# Patient Record
Sex: FEMALE | Race: WHITE | NOT HISPANIC OR LATINO | URBAN - METROPOLITAN AREA
[De-identification: names, ages, dates, MRNs, and addresses within clinical notes are randomized per-mention and may not be internally consistent; named-entity substitution may affect disease eponyms.]

---

## 2017-01-11 ENCOUNTER — FOLLOW UP ESTABLISHED (OUTPATIENT)
Dept: URBAN - METROPOLITAN AREA CLINIC 24 | Facility: CLINIC | Age: 72
End: 2017-01-11
Payer: MEDICARE

## 2017-01-11 DIAGNOSIS — H40.033 ANATOMICAL NARROW ANGLE, BILATERAL: Primary | ICD-10-CM

## 2017-01-11 PROCEDURE — 92012 INTRM OPH EXAM EST PATIENT: CPT | Performed by: OPHTHALMOLOGY

## 2017-01-11 ASSESSMENT — INTRAOCULAR PRESSURE
OS: 18
OD: 18

## 2021-01-14 DIAGNOSIS — Z23 NEED FOR VACCINATION: ICD-10-CM

## 2021-09-04 ENCOUNTER — OFFICE VISIT (OUTPATIENT)
Dept: URGENT CARE | Facility: CLINIC | Age: 76
End: 2021-09-04
Payer: MEDICARE

## 2021-09-04 ENCOUNTER — APPOINTMENT (OUTPATIENT)
Dept: RADIOLOGY | Facility: MEDICAL CENTER | Age: 76
End: 2021-09-04
Attending: EMERGENCY MEDICINE
Payer: MEDICARE

## 2021-09-04 ENCOUNTER — HOSPITAL ENCOUNTER (OUTPATIENT)
Facility: MEDICAL CENTER | Age: 76
End: 2021-09-05
Attending: EMERGENCY MEDICINE | Admitting: HOSPITALIST
Payer: MEDICARE

## 2021-09-04 VITALS
RESPIRATION RATE: 16 BRPM | BODY MASS INDEX: 34.23 KG/M2 | OXYGEN SATURATION: 92 % | HEIGHT: 62 IN | WEIGHT: 186 LBS | TEMPERATURE: 97.9 F | HEART RATE: 89 BPM

## 2021-09-04 DIAGNOSIS — K52.9 GASTROENTERITIS: ICD-10-CM

## 2021-09-04 DIAGNOSIS — J06.9 URI, ACUTE: ICD-10-CM

## 2021-09-04 DIAGNOSIS — I48.91 ATRIAL FIBRILLATION WITH RAPID VENTRICULAR RESPONSE (HCC): ICD-10-CM

## 2021-09-04 DIAGNOSIS — U07.1 COVID-19: ICD-10-CM

## 2021-09-04 PROBLEM — I10 ESSENTIAL HYPERTENSION: Status: ACTIVE | Noted: 2021-09-04

## 2021-09-04 PROBLEM — M06.9 RHEUMATOID ARTHRITIS INVOLVING MULTIPLE SITES (HCC): Status: ACTIVE | Noted: 2021-09-04

## 2021-09-04 PROBLEM — E86.0 DEHYDRATION: Status: ACTIVE | Noted: 2021-09-04

## 2021-09-04 PROBLEM — E87.6 HYPOKALEMIA: Status: ACTIVE | Noted: 2021-09-04

## 2021-09-04 LAB
ALBUMIN SERPL BCP-MCNC: 3.4 G/DL (ref 3.2–4.9)
ALBUMIN/GLOB SERPL: 0.9 G/DL
ALP SERPL-CCNC: 72 U/L (ref 30–99)
ALT SERPL-CCNC: 17 U/L (ref 2–50)
ANION GAP SERPL CALC-SCNC: 15 MMOL/L (ref 7–16)
AST SERPL-CCNC: 23 U/L (ref 12–45)
BASOPHILS # BLD AUTO: 0.3 % (ref 0–1.8)
BASOPHILS # BLD: 0.03 K/UL (ref 0–0.12)
BILIRUB SERPL-MCNC: 0.8 MG/DL (ref 0.1–1.5)
BUN SERPL-MCNC: 19 MG/DL (ref 8–22)
CALCIUM SERPL-MCNC: 9.4 MG/DL (ref 8.4–10.2)
CHLORIDE SERPL-SCNC: 95 MMOL/L (ref 96–112)
CO2 SERPL-SCNC: 26 MMOL/L (ref 20–33)
CREAT SERPL-MCNC: 0.73 MG/DL (ref 0.5–1.4)
EKG IMPRESSION: NORMAL
EKG IMPRESSION: NORMAL
EOSINOPHIL # BLD AUTO: 0.07 K/UL (ref 0–0.51)
EOSINOPHIL NFR BLD: 0.7 % (ref 0–6.9)
ERYTHROCYTE [DISTWIDTH] IN BLOOD BY AUTOMATED COUNT: 50 FL (ref 35.9–50)
FLUAV RNA SPEC QL NAA+PROBE: NEGATIVE
FLUBV RNA SPEC QL NAA+PROBE: NEGATIVE
GLOBULIN SER CALC-MCNC: 3.8 G/DL (ref 1.9–3.5)
GLUCOSE SERPL-MCNC: 92 MG/DL (ref 65–99)
HCT VFR BLD AUTO: 45.1 % (ref 37–47)
HGB BLD-MCNC: 15.1 G/DL (ref 12–16)
IMM GRANULOCYTES # BLD AUTO: 0.05 K/UL (ref 0–0.11)
IMM GRANULOCYTES NFR BLD AUTO: 0.5 % (ref 0–0.9)
LIPASE SERPL-CCNC: 18 U/L (ref 7–58)
LYMPHOCYTES # BLD AUTO: 2.17 K/UL (ref 1–4.8)
LYMPHOCYTES NFR BLD: 22.9 % (ref 22–41)
MCH RBC QN AUTO: 31.3 PG (ref 27–33)
MCHC RBC AUTO-ENTMCNC: 33.5 G/DL (ref 33.6–35)
MCV RBC AUTO: 93.6 FL (ref 81.4–97.8)
MONOCYTES # BLD AUTO: 0.79 K/UL (ref 0–0.85)
MONOCYTES NFR BLD AUTO: 8.4 % (ref 0–13.4)
NEUTROPHILS # BLD AUTO: 6.35 K/UL (ref 2–7.15)
NEUTROPHILS NFR BLD: 67.2 % (ref 44–72)
NRBC # BLD AUTO: 0 K/UL
NRBC BLD-RTO: 0 /100 WBC
PLATELET # BLD AUTO: 263 K/UL (ref 164–446)
PMV BLD AUTO: 10.4 FL (ref 9–12.9)
POTASSIUM SERPL-SCNC: 3.3 MMOL/L (ref 3.6–5.5)
PROT SERPL-MCNC: 7.2 G/DL (ref 6–8.2)
RBC # BLD AUTO: 4.82 M/UL (ref 4.2–5.4)
RSV RNA SPEC QL NAA+PROBE: NEGATIVE
SARS-COV-2 RNA RESP QL NAA+PROBE: DETECTED
SODIUM SERPL-SCNC: 136 MMOL/L (ref 135–145)
SPECIMEN SOURCE: ABNORMAL
TROPONIN T SERPL-MCNC: 9 NG/L (ref 6–19)
WBC # BLD AUTO: 9.5 K/UL (ref 4.8–10.8)

## 2021-09-04 PROCEDURE — 99220 PR INITIAL OBSERVATION CARE,LEVL III: CPT | Performed by: HOSPITALIST

## 2021-09-04 PROCEDURE — 94760 N-INVAS EAR/PLS OXIMETRY 1: CPT

## 2021-09-04 PROCEDURE — 85025 COMPLETE CBC W/AUTO DIFF WBC: CPT

## 2021-09-04 PROCEDURE — A9270 NON-COVERED ITEM OR SERVICE: HCPCS | Performed by: HOSPITALIST

## 2021-09-04 PROCEDURE — 93005 ELECTROCARDIOGRAM TRACING: CPT | Performed by: EMERGENCY MEDICINE

## 2021-09-04 PROCEDURE — G0378 HOSPITAL OBSERVATION PER HR: HCPCS

## 2021-09-04 PROCEDURE — C9803 HOPD COVID-19 SPEC COLLECT: HCPCS | Performed by: EMERGENCY MEDICINE

## 2021-09-04 PROCEDURE — 700111 HCHG RX REV CODE 636 W/ 250 OVERRIDE (IP): Performed by: EMERGENCY MEDICINE

## 2021-09-04 PROCEDURE — 0241U HCHG SARS-COV-2 COVID-19 NFCT DS RESP RNA 4 TRGT MIC: CPT

## 2021-09-04 PROCEDURE — 80053 COMPREHEN METABOLIC PANEL: CPT

## 2021-09-04 PROCEDURE — 83690 ASSAY OF LIPASE: CPT

## 2021-09-04 PROCEDURE — 84484 ASSAY OF TROPONIN QUANT: CPT

## 2021-09-04 PROCEDURE — 700101 HCHG RX REV CODE 250: Performed by: HOSPITALIST

## 2021-09-04 PROCEDURE — 71045 X-RAY EXAM CHEST 1 VIEW: CPT

## 2021-09-04 PROCEDURE — M0243 CASIRIVI AND IMDEVI INFUSION: HCPCS

## 2021-09-04 PROCEDURE — 99213 OFFICE O/P EST LOW 20 MIN: CPT | Performed by: NURSE PRACTITIONER

## 2021-09-04 PROCEDURE — 700102 HCHG RX REV CODE 250 W/ 637 OVERRIDE(OP): Performed by: HOSPITALIST

## 2021-09-04 PROCEDURE — 99285 EMERGENCY DEPT VISIT HI MDM: CPT

## 2021-09-04 RX ORDER — HYDROCODONE BITARTRATE AND ACETAMINOPHEN 5; 325 MG/1; MG/1
.5-1 TABLET ORAL EVERY 4 HOURS PRN
Status: DISCONTINUED | OUTPATIENT
Start: 2021-09-04 | End: 2021-09-05 | Stop reason: HOSPADM

## 2021-09-04 RX ORDER — HYDROCODONE BITARTRATE AND ACETAMINOPHEN 5; 325 MG/1; MG/1
.5-1 TABLET ORAL EVERY 4 HOURS PRN
COMMUNITY
End: 2023-06-22

## 2021-09-04 RX ORDER — SODIUM CHLORIDE AND POTASSIUM CHLORIDE 300; 900 MG/100ML; MG/100ML
INJECTION, SOLUTION INTRAVENOUS CONTINUOUS
Status: DISPENSED | OUTPATIENT
Start: 2021-09-04 | End: 2021-09-05

## 2021-09-04 RX ORDER — IBUPROFEN 200 MG
600 TABLET ORAL EVERY 8 HOURS PRN
COMMUNITY

## 2021-09-04 RX ORDER — OXYCODONE HYDROCHLORIDE 5 MG/1
2.5 TABLET ORAL
Status: DISCONTINUED | OUTPATIENT
Start: 2021-09-04 | End: 2021-09-04

## 2021-09-04 RX ORDER — HYDROCHLOROTHIAZIDE 25 MG/1
25 TABLET ORAL
COMMUNITY

## 2021-09-04 RX ORDER — METOPROLOL TARTRATE 1 MG/ML
5 INJECTION, SOLUTION INTRAVENOUS
Status: DISCONTINUED | OUTPATIENT
Start: 2021-09-04 | End: 2021-09-05 | Stop reason: HOSPADM

## 2021-09-04 RX ORDER — ALBUTEROL SULFATE 90 UG/1
1 AEROSOL, METERED RESPIRATORY (INHALATION) ONCE
COMMUNITY

## 2021-09-04 RX ORDER — POLYETHYLENE GLYCOL 3350 17 G/17G
1 POWDER, FOR SOLUTION ORAL
Status: DISCONTINUED | OUTPATIENT
Start: 2021-09-04 | End: 2021-09-05 | Stop reason: HOSPADM

## 2021-09-04 RX ORDER — OXYCODONE HYDROCHLORIDE 5 MG/1
5 TABLET ORAL
Status: DISCONTINUED | OUTPATIENT
Start: 2021-09-04 | End: 2021-09-04

## 2021-09-04 RX ORDER — BISACODYL 10 MG
10 SUPPOSITORY, RECTAL RECTAL
Status: DISCONTINUED | OUTPATIENT
Start: 2021-09-04 | End: 2021-09-05 | Stop reason: HOSPADM

## 2021-09-04 RX ORDER — DILTIAZEM HYDROCHLORIDE 5 MG/ML
20 INJECTION INTRAVENOUS ONCE
Status: DISCONTINUED | OUTPATIENT
Start: 2021-09-04 | End: 2021-09-05

## 2021-09-04 RX ORDER — HYDROMORPHONE HYDROCHLORIDE 1 MG/ML
0.25 INJECTION, SOLUTION INTRAMUSCULAR; INTRAVENOUS; SUBCUTANEOUS
Status: DISCONTINUED | OUTPATIENT
Start: 2021-09-04 | End: 2021-09-04

## 2021-09-04 RX ORDER — AMOXICILLIN 250 MG
2 CAPSULE ORAL 2 TIMES DAILY
Status: DISCONTINUED | OUTPATIENT
Start: 2021-09-04 | End: 2021-09-05 | Stop reason: HOSPADM

## 2021-09-04 RX ORDER — ACETAMINOPHEN 325 MG/1
650 TABLET ORAL EVERY 6 HOURS PRN
Status: DISCONTINUED | OUTPATIENT
Start: 2021-09-04 | End: 2021-09-05 | Stop reason: HOSPADM

## 2021-09-04 RX ADMIN — CASIRIVIMAB AND IMDEVIMAB 300 MG: 600; 600 INJECTION, SOLUTION, CONCENTRATE INTRAVENOUS at 17:14

## 2021-09-04 RX ADMIN — HYDROCODONE BITARTRATE AND ACETAMINOPHEN 1 TABLET: 5; 325 TABLET ORAL at 20:50

## 2021-09-04 RX ADMIN — POTASSIUM CHLORIDE AND SODIUM CHLORIDE: 900; 300 INJECTION, SOLUTION INTRAVENOUS at 22:19

## 2021-09-04 ASSESSMENT — COGNITIVE AND FUNCTIONAL STATUS - GENERAL
TOILETING: A LITTLE
DRESSING REGULAR UPPER BODY CLOTHING: A LITTLE
WALKING IN HOSPITAL ROOM: A LITTLE
TURNING FROM BACK TO SIDE WHILE IN FLAT BAD: A LITTLE
DAILY ACTIVITIY SCORE: 21
MOVING TO AND FROM BED TO CHAIR: A LITTLE
SUGGESTED CMS G CODE MODIFIER DAILY ACTIVITY: CJ
HELP NEEDED FOR BATHING: A LITTLE
MOVING FROM LYING ON BACK TO SITTING ON SIDE OF FLAT BED: A LITTLE
MOBILITY SCORE: 20
SUGGESTED CMS G CODE MODIFIER MOBILITY: CJ

## 2021-09-04 ASSESSMENT — ENCOUNTER SYMPTOMS
HALLUCINATIONS: 0
WEIGHT LOSS: 1
COUGH: 1
FEVER: 1
FOCAL WEAKNESS: 0
SHORTNESS OF BREATH: 1
SEIZURES: 0
NERVOUS/ANXIOUS: 0
NAUSEA: 1
HEMOPTYSIS: 0
SORE THROAT: 0
SPEECH CHANGE: 0
CHILLS: 1
STRIDOR: 0
SPUTUM PRODUCTION: 1
FLANK PAIN: 0
COUGH: 1
CHILLS: 1
EYE DISCHARGE: 0
ROS GI COMMENTS: ANOREXIA
DIARRHEA: 1
MYALGIAS: 0
RHINORRHEA: 1
LOSS OF CONSCIOUSNESS: 0
BRUISES/BLEEDS EASILY: 0

## 2021-09-04 ASSESSMENT — CHA2DS2 SCORE
CHF OR LEFT VENTRICULAR DYSFUNCTION: NO
AGE 75 OR GREATER: YES
DIABETES: NO
CHA2DS2 VASC SCORE: 4
SEX: FEMALE
AGE 65 TO 74: NO
HYPERTENSION: YES
PRIOR STROKE OR TIA OR THROMBOEMBOLISM: NO
VASCULAR DISEASE: NO

## 2021-09-04 NOTE — ED NOTES
Med Rec completed per patient and patient's med list on her phone (Returned)  Allergies reviewed  No ORAL antibiotics in last 30 days     Patient has Remicade IV q 8 weeks. Her most recent was 8/10/21

## 2021-09-04 NOTE — PROGRESS NOTES
Subjective     Ania Quevedo is a 75 y.o. female who presents with Illness (x 1 week, cough, diarrhea, back pain)    History reviewed. No pertinent past medical history.  Social History     Socioeconomic History   • Marital status:      Spouse name: Not on file   • Number of children: Not on file   • Years of education: Not on file   • Highest education level: Not on file   Occupational History   • Not on file   Tobacco Use   • Smoking status: Never Smoker   • Smokeless tobacco: Never Used   Substance and Sexual Activity   • Alcohol use: Not on file   • Drug use: Not on file   • Sexual activity: Not on file   Other Topics Concern   • Not on file   Social History Narrative   • Not on file     Social Determinants of Health     Financial Resource Strain:    • Difficulty of Paying Living Expenses:    Food Insecurity:    • Worried About Running Out of Food in the Last Year:    • Ran Out of Food in the Last Year:    Transportation Needs:    • Lack of Transportation (Medical):    • Lack of Transportation (Non-Medical):    Physical Activity:    • Days of Exercise per Week:    • Minutes of Exercise per Session:    Stress:    • Feeling of Stress :    Social Connections:    • Frequency of Communication with Friends and Family:    • Frequency of Social Gatherings with Friends and Family:    • Attends [employer] Services:    • Active Member of Clubs or Organizations:    • Attends Club or Organization Meetings:    • Marital Status:    Intimate Partner Violence:    • Fear of Current or Ex-Partner:    • Emotionally Abused:    • Physically Abused:    • Sexually Abused:      History reviewed. No pertinent family history.    Allergies: Patient has no allergy information on record.    Patient is a 75-year-old female who presents today with complaint of 1 week onset of cough, fatigue and malaise, shortness of breath, diarrhea, and lower back pain.  Patient endorses dyspnea with exertion.  No vomiting.  Oral intake has been  "decreased.          URI   This is a new problem. The current episode started in the past 7 days. The problem has been unchanged. There has been no fever. Associated symptoms include congestion, coughing and rhinorrhea. She has tried nothing for the symptoms. The treatment provided no relief.       Review of Systems   Constitutional: Positive for chills and malaise/fatigue.   HENT: Positive for congestion and rhinorrhea.    Respiratory: Positive for cough.    Skin: Negative.    All other systems reviewed and are negative.             Objective     Pulse 89   Temp 36.6 °C (97.9 °F) (Temporal)   Resp 16   Ht 1.575 m (5' 2\")   Wt 84.4 kg (186 lb)   SpO2 92%   BMI 34.02 kg/m²      Physical Exam  Vitals reviewed.   Constitutional:       Appearance: She is ill-appearing.   HENT:      Head: Normocephalic and atraumatic.      Right Ear: Tympanic membrane, ear canal and external ear normal.      Left Ear: Tympanic membrane, ear canal and external ear normal.      Nose: Congestion present.      Mouth/Throat:      Mouth: Mucous membranes are moist.   Eyes:      Extraocular Movements: Extraocular movements intact.      Conjunctiva/sclera: Conjunctivae normal.      Pupils: Pupils are equal, round, and reactive to light.   Cardiovascular:      Rate and Rhythm: Normal rate and regular rhythm.      Heart sounds: Normal heart sounds.   Pulmonary:      Effort: Pulmonary effort is normal. No respiratory distress.      Breath sounds: No stridor. Rales present. No wheezing or rhonchi.      Comments: Few scattered rales  Chest:      Chest wall: No tenderness.   Musculoskeletal:         General: Normal range of motion.      Cervical back: Normal range of motion and neck supple.   Skin:     General: Skin is warm.      Coloration: Skin is pale.   Neurological:      Mental Status: She is alert and oriented to person, place, and time.   Psychiatric:         Mood and Affect: Mood normal.         Behavior: Behavior normal.         Thought " Content: Thought content normal.         Judgment: Judgment normal.       Patient's oxygen saturation is 92% on room air.  She appears to be clinically ill.  As she has not been eating or drinking well, I am concerned that she needs a higher level of care than I can reasonably and safely provide here in the urgent care.  I have discussed going on to emergency room with patient and she is willing to do that at this time.  She is accompanied today by her  who is also ill with similar symptoms.                      Assessment & Plan   URI  Gastroenteritis    See note above  Patient is referred on to ER at this time for higher level of care and further evaluation.          There are no diagnoses linked to this encounter.

## 2021-09-04 NOTE — ED TRIAGE NOTES
"Pt is referred to our facility from [facility] for further evaluation and management of a possible acute URI with exertional dyspnea, and weakness protracting for the past 2 weeks.  She also C/O \"severe\" back pain recurring for the past 2 weeks.   Chief Complaint   Patient presents with   • Shortness of Breath   • Fatigue   • Back Pain   • Cough     /80   Pulse 80   Temp 36.1 °C (96.9 °F) (Temporal)   Resp 20   Ht 1.575 m (5' 2\")   Wt 85 kg (187 lb 6.3 oz)   SpO2 93%   BMI 34.27 kg/m²   Has this patient been vaccinated for COVID YES  If not, would they like to be vaccinated while in the ER if eligible?  N/A  Would the patient like to speak with the ERP about the possibility of receiving the COVID vaccine today before making a decision? N/A    "

## 2021-09-04 NOTE — ED PROVIDER NOTES
ED Provider Note    CHIEF COMPLAINT  Chief Complaint   Patient presents with   • Shortness of Breath   • Fatigue   • Back Pain   • Cough       HPI  Ania Quevedo is a 75 y.o. female who presents to the emergency department complaining that she has been ill for for 5 days and was recently exposed to Covid.  The patient has had her Covid vaccinations but her  has similar symptoms and was diagnosed with Covid today and has been hospitalized.  The patient has been coughing feeling short of breath having headache and low back discomfort and a little bit of diarrhea she has barely been eating or drinking anything and she feels very weak.  The patient notes she has a history of rheumatoid arthritis and takes methotrexate and Remicade.    REVIEW OF SYSTEMS no hemoptysis no black or bloody stool or emesis.  All other systems negative    PAST MEDICAL HISTORY  History reviewed. No pertinent past medical history.    FAMILY HISTORY  History reviewed. No pertinent family history.    SOCIAL HISTORY  Social History     Socioeconomic History   • Marital status:      Spouse name: Not on file   • Number of children: Not on file   • Years of education: Not on file   • Highest education level: Not on file   Occupational History   • Not on file   Tobacco Use   • Smoking status: Never Smoker   • Smokeless tobacco: Never Used   Substance and Sexual Activity   • Alcohol use: Not Currently   • Drug use: Never   • Sexual activity: Not on file   Other Topics Concern   • Not on file   Social History Narrative   • Not on file     Social Determinants of Health     Financial Resource Strain:    • Difficulty of Paying Living Expenses:    Food Insecurity:    • Worried About Running Out of Food in the Last Year:    • Ran Out of Food in the Last Year:    Transportation Needs:    • Lack of Transportation (Medical):    • Lack of Transportation (Non-Medical):    Physical Activity:    • Days of Exercise per Week:    • Minutes of Exercise per  "Session:    Stress:    • Feeling of Stress :    Social Connections:    • Frequency of Communication with Friends and Family:    • Frequency of Social Gatherings with Friends and Family:    • Attends [employer]regational Services:    • Active Member of Clubs or Organizations:    • Attends Club or Organization Meetings:    • Marital Status:    Intimate Partner Violence:    • Fear of Current or Ex-Partner:    • Emotionally Abused:    • Physically Abused:    • Sexually Abused:        SURGICAL HISTORY  History reviewed. No pertinent surgical history.    CURRENT MEDICATIONS  Home Medications     Reviewed by Noel Park R.N. (Registered Nurse) on 09/04/21 at 1230  Med List Status: Not Addressed   Medication Last Dose Status   hydroCHLOROthiazide (HYDRODIURIL) 25 MG Tab  Active   HYDROcodone-acetaminophen (NORCO) 5-325 MG Tab per tablet  Active   inFLIXimab (REMICADE IV)  Active   methotrexate 2.5 MG Tab  Active                ALLERGIES  No Known Allergies    PHYSICAL EXAM  VITAL SIGNS: /85   Pulse 87   Temp 36.1 °C (96.9 °F) (Temporal)   Resp 19   Ht 1.575 m (5' 2\")   Wt 85 kg (187 lb 6.3 oz)   SpO2 96%   BMI 34.27 kg/m²    Oxygen saturation is interpreted as adequate on supplemental oxygen but hypoxic on room air with values in the upper 80s  Constitutional: The patient is awake verbal she does appear ill  Eyes: No erythema discharge or jaundice  Neck: Trachea midline no JVD no meningeal findings  Cardiovascular: Initially regular rate and rhythm although the patient did have a change in rhythm to atrial fibrillation with RVR while in the ER see notes below  Lungs: Scattered mild rhonchi in both lung bases  Abdomen/Back: Soft nontender nondistended no rebound guarding or peritoneal findings  Skin: Warm and dry  Musculoskeletal: No leg edema or calf tenderness  Neurologic: Awake lucid verbal moving all extremities    Laboratory  CBC shows white blood cell count of 9.5 hemoglobin is adequate at 15.1 basic metabolic " panel is unremarkable LFTs and lipase are normal troponin is normal at 9.  Covid testing is positive    EKG interpretation  The initial EKG was obtained at the time of arrival and showed a sinus rhythm 72 bpm with no pathologic ST elevation or depression OR interval was 160 ms and the QTc interval 447 ms.    As noted above the patient had a change in rhythm while in the ER and a repeat EKG was obtained this EKG shows an irregularly irregular rhythm 137 bpm consistent with atrial fibrillation with rapid ventricular response.  It does not show ST elevation.    Radiology  DX-CHEST-PORTABLE (1 VIEW)   Final Result         No acute cardiac or pulmonary abnormality is identified.      EC-ECHOCARDIOGRAM COMPLETE W/O CONT    (Results Pending)         MEDICAL DECISION MAKING and DISPOSITION  Initially the patient's oxygen saturation was hovering around 90% on room air and she appeared to be in a normal sinus rhythm and appeared to be a candidate for going home even though she had been diagnosed with Covid.  The patient was counseled regarding the availability of Regeneron and I have discussed risks and benefits with her and also the ER pharmacist has reviewed this with the patient and she would like to proceed with this treatment and it was given in the ER.  Shortly before the planned time of discharge the patient got up and walked to the bathroom and when she came back and was placed on the cardiac monitor she was in rapid atrial fibrillation with RVR.  The patient says she feels very weak but she does not feel palpitations or chest pain and she states that she has never had atrial fibrillation before.  The patient's oxygen saturation also fell into the upper 90s as low as 88% on room air while I was in the room and she was placed on supplemental oxygen by nasal cannula.  I have ordered intravenous Cardizem for the patient.  At this point in time I do not feel that it is going to be safe to send the patient home we cannot  arrange home oxygen therapy and monitoring on the weekends and these are going to be necessary in the immediate future therefore I reviewed the case with the hospitalist and the patient is referred to the hospitalist service for further evaluation and treatment.    While in the emergency department the patient's rhythm did spontaneously convert to normal sinus rhythm.      IMPRESSION  1.  COVID-19 infection  2.  Hypoxia  3.  Paroxysm of atrial fibrillation, new onset, with rapid ventricular response      Electronically signed by: Christos Cortes M.D., 9/4/2021 3:31 PM

## 2021-09-05 ENCOUNTER — APPOINTMENT (OUTPATIENT)
Dept: CARDIOLOGY | Facility: MEDICAL CENTER | Age: 76
End: 2021-09-05
Attending: HOSPITALIST
Payer: MEDICARE

## 2021-09-05 VITALS
OXYGEN SATURATION: 92 % | RESPIRATION RATE: 18 BRPM | DIASTOLIC BLOOD PRESSURE: 57 MMHG | HEIGHT: 62 IN | WEIGHT: 187.39 LBS | TEMPERATURE: 97.5 F | BODY MASS INDEX: 34.48 KG/M2 | HEART RATE: 66 BPM | SYSTOLIC BLOOD PRESSURE: 108 MMHG

## 2021-09-05 LAB
ALBUMIN SERPL BCP-MCNC: 3.2 G/DL (ref 3.2–4.9)
ALBUMIN/GLOB SERPL: 0.9 G/DL
ALP SERPL-CCNC: 73 U/L (ref 30–99)
ALT SERPL-CCNC: 16 U/L (ref 2–50)
ANION GAP SERPL CALC-SCNC: 12 MMOL/L (ref 7–16)
AST SERPL-CCNC: 21 U/L (ref 12–45)
BASOPHILS # BLD AUTO: 0.4 % (ref 0–1.8)
BASOPHILS # BLD: 0.03 K/UL (ref 0–0.12)
BILIRUB SERPL-MCNC: 0.7 MG/DL (ref 0.1–1.5)
BUN SERPL-MCNC: 14 MG/DL (ref 8–22)
CALCIUM SERPL-MCNC: 9.3 MG/DL (ref 8.4–10.2)
CHLORIDE SERPL-SCNC: 99 MMOL/L (ref 96–112)
CO2 SERPL-SCNC: 26 MMOL/L (ref 20–33)
CREAT SERPL-MCNC: 0.67 MG/DL (ref 0.5–1.4)
CRP SERPL HS-MCNC: 2.8 MG/DL (ref 0–0.75)
EOSINOPHIL # BLD AUTO: 0.1 K/UL (ref 0–0.51)
EOSINOPHIL NFR BLD: 1.2 % (ref 0–6.9)
ERYTHROCYTE [DISTWIDTH] IN BLOOD BY AUTOMATED COUNT: 47.8 FL (ref 35.9–50)
ERYTHROCYTE [SEDIMENTATION RATE] IN BLOOD BY WESTERGREN METHOD: 22 MM/HOUR (ref 0–25)
GLOBULIN SER CALC-MCNC: 3.5 G/DL (ref 1.9–3.5)
GLUCOSE SERPL-MCNC: 144 MG/DL (ref 65–99)
HAV IGM SERPL QL IA: NORMAL
HBV CORE IGM SER QL: NORMAL
HBV SURFACE AG SER QL: NORMAL
HCT VFR BLD AUTO: 44.2 % (ref 37–47)
HCV AB SER QL: NORMAL
HGB BLD-MCNC: 15.2 G/DL (ref 12–16)
IMM GRANULOCYTES # BLD AUTO: 0.04 K/UL (ref 0–0.11)
IMM GRANULOCYTES NFR BLD AUTO: 0.5 % (ref 0–0.9)
LV EJECT FRACT  99904: 70
LV EJECT FRACT MOD 2C 99903: 65.46
LV EJECT FRACT MOD 4C 99902: 71.95
LV EJECT FRACT MOD BP 99901: 68.41
LYMPHOCYTES # BLD AUTO: 2.52 K/UL (ref 1–4.8)
LYMPHOCYTES NFR BLD: 30.6 % (ref 22–41)
MAGNESIUM SERPL-MCNC: 1.9 MG/DL (ref 1.5–2.5)
MCH RBC QN AUTO: 31.5 PG (ref 27–33)
MCHC RBC AUTO-ENTMCNC: 34.4 G/DL (ref 33.6–35)
MCV RBC AUTO: 91.5 FL (ref 81.4–97.8)
MONOCYTES # BLD AUTO: 0.72 K/UL (ref 0–0.85)
MONOCYTES NFR BLD AUTO: 8.7 % (ref 0–13.4)
NEUTROPHILS # BLD AUTO: 4.82 K/UL (ref 2–7.15)
NEUTROPHILS NFR BLD: 58.6 % (ref 44–72)
NRBC # BLD AUTO: 0 K/UL
NRBC BLD-RTO: 0 /100 WBC
NT-PROBNP SERPL IA-MCNC: 340 PG/ML (ref 0–125)
PLATELET # BLD AUTO: 259 K/UL (ref 164–446)
PMV BLD AUTO: 9.7 FL (ref 9–12.9)
POTASSIUM SERPL-SCNC: 3.4 MMOL/L (ref 3.6–5.5)
PROT SERPL-MCNC: 6.7 G/DL (ref 6–8.2)
RBC # BLD AUTO: 4.83 M/UL (ref 4.2–5.4)
SODIUM SERPL-SCNC: 137 MMOL/L (ref 135–145)
TSH SERPL DL<=0.005 MIU/L-ACNC: 2.67 UIU/ML (ref 0.38–5.33)
WBC # BLD AUTO: 8.2 K/UL (ref 4.8–10.8)

## 2021-09-05 PROCEDURE — 93325 DOPPLER ECHO COLOR FLOW MAPG: CPT

## 2021-09-05 PROCEDURE — A9270 NON-COVERED ITEM OR SERVICE: HCPCS | Performed by: HOSPITALIST

## 2021-09-05 PROCEDURE — 85652 RBC SED RATE AUTOMATED: CPT

## 2021-09-05 PROCEDURE — 93325 DOPPLER ECHO COLOR FLOW MAPG: CPT | Mod: 26 | Performed by: INTERNAL MEDICINE

## 2021-09-05 PROCEDURE — 84443 ASSAY THYROID STIM HORMONE: CPT

## 2021-09-05 PROCEDURE — 700102 HCHG RX REV CODE 250 W/ 637 OVERRIDE(OP): Performed by: HOSPITALIST

## 2021-09-05 PROCEDURE — 83880 ASSAY OF NATRIURETIC PEPTIDE: CPT

## 2021-09-05 PROCEDURE — 80053 COMPREHEN METABOLIC PANEL: CPT

## 2021-09-05 PROCEDURE — 93308 TTE F-UP OR LMTD: CPT | Mod: 26 | Performed by: INTERNAL MEDICINE

## 2021-09-05 PROCEDURE — 80074 ACUTE HEPATITIS PANEL: CPT

## 2021-09-05 PROCEDURE — 93321 DOPPLER ECHO F-UP/LMTD STD: CPT | Mod: 26 | Performed by: INTERNAL MEDICINE

## 2021-09-05 PROCEDURE — 85025 COMPLETE CBC W/AUTO DIFF WBC: CPT

## 2021-09-05 PROCEDURE — G0378 HOSPITAL OBSERVATION PER HR: HCPCS

## 2021-09-05 PROCEDURE — 86140 C-REACTIVE PROTEIN: CPT

## 2021-09-05 PROCEDURE — 83735 ASSAY OF MAGNESIUM: CPT

## 2021-09-05 PROCEDURE — 99217 PR OBSERVATION CARE DISCHARGE: CPT | Performed by: STUDENT IN AN ORGANIZED HEALTH CARE EDUCATION/TRAINING PROGRAM

## 2021-09-05 RX ORDER — WARFARIN SODIUM 5 MG/1
5 TABLET ORAL
Status: DISCONTINUED | OUTPATIENT
Start: 2021-09-05 | End: 2021-09-05 | Stop reason: HOSPADM

## 2021-09-05 RX ORDER — ASPIRIN 81 MG/1
81 TABLET, CHEWABLE ORAL DAILY
Qty: 30 TABLET | Refills: 0 | Status: SHIPPED | OUTPATIENT
Start: 2021-09-05

## 2021-09-05 RX ADMIN — HYDROCODONE BITARTRATE AND ACETAMINOPHEN 0.5 TABLET: 5; 325 TABLET ORAL at 05:03

## 2021-09-05 RX ADMIN — METOPROLOL TARTRATE 12.5 MG: 25 TABLET, FILM COATED ORAL at 10:32

## 2021-09-05 ASSESSMENT — COPD QUESTIONNAIRES: HAVE YOU SMOKED AT LEAST 100 CIGARETTES IN YOUR ENTIRE LIFE: NO/DON'T KNOW

## 2021-09-05 NOTE — ASSESSMENT & PLAN NOTE
We will switch hydrochlorothiazide to metoprolol given her dehydration and new onset atrial fibrillation

## 2021-09-05 NOTE — ASSESSMENT & PLAN NOTE
Got intravenous diltiazem  in the emergency department with improvement, and conversion to a sinus rhythm.  I will start scheduled metoprolol, consider switching to long-acting form according to clinical course.  I will start PRN metoprolol sustained HR > 120   Continuous cardiac monitoring.

## 2021-09-05 NOTE — ASSESSMENT & PLAN NOTE
Multifactorial, likely secondary to infection, hydrochlorothiazide use, reduced oral intake.  We will hold hydrochlorothiazide, encourage oral intake, antiemetics as needed.  Judicious use of intravenous fluids, monitor volume status closely

## 2021-09-05 NOTE — ASSESSMENT & PLAN NOTE
On immunosuppressants including methotrexate and infliximab  Not currently in exacerbation, multimodal pain control

## 2021-09-05 NOTE — DISCHARGE INSTRUCTIONS
You should quarantine at home for 14 days as you are Covid test result is positive.  You should arrange follow-up with your primary care doctor in 2 weeks.  Return here if you feel you are developing new or worsening symptoms.    Discharge Instructions    Discharged to home by car with relative. Discharged via wheelchair, hospital escort: Yes.  Special equipment needed: Not Applicable    Be sure to schedule a follow-up appointment with your primary care doctor or any specialists as instructed.     Discharge Plan:   Diet Plan: Discussed  Activity Level: Discussed  Confirmed Follow up Appointment: Patient to Call and Schedule Appointment  Confirmed Symptoms Management: Discussed  Medication Reconciliation Updated: Yes    I understand that a diet low in cholesterol, fat, and sodium is recommended for good health. Unless I have been given specific instructions below for another diet, I accept this instruction as my diet prescription.   Other diet: Regular    Special Instructions: None    · Is patient discharged on Warfarin / Coumadin?   No       COVID-19  COVID-19 is a respiratory infection that is caused by a virus called severe acute respiratory syndrome coronavirus 2 (SARS-CoV-2). The disease is also known as coronavirus disease or novel coronavirus. In some people, the virus may not cause any symptoms. In others, it may cause a serious infection. The infection can get worse quickly and can lead to complications, such as:  · Pneumonia, or infection of the lungs.  · Acute respiratory distress syndrome or ARDS. This is fluid build-up in the lungs.  · Acute respiratory failure. This is a condition in which there is not enough oxygen passing from the lungs to the body.  · Sepsis or septic shock. This is a serious bodily reaction to an infection.  · Blood clotting problems.  · Secondary infections due to bacteria or fungus.  The virus that causes COVID-19 is contagious. This means that it can spread from person to person  through droplets from coughs and sneezes (respiratory secretions).  What are the causes?  This illness is caused by a virus. You may catch the virus by:  · Breathing in droplets from an infected person's cough or sneeze.  · Touching something, like a table or a doorknob, that was exposed to the virus (contaminated) and then touching your mouth, nose, or eyes.  What increases the risk?  Risk for infection  You are more likely to be infected with this virus if you:  · Live in or travel to an area with a COVID-19 outbreak.  · Come in contact with a sick person who recently traveled to an area with a COVID-19 outbreak.  · Provide care for or live with a person who is infected with COVID-19.  Risk for serious illness  You are more likely to become seriously ill from the virus if you:  · Are 65 years of age or older.  · Have a long-term disease that lowers your body's ability to fight infection (immunocompromised).  · Live in a nursing home or long-term care facility.  · Have a long-term (chronic) disease such as:  ? Chronic lung disease, including chronic obstructive pulmonary disease or asthma  ? Heart disease.  ? Diabetes.  ? Chronic kidney disease.  ? Liver disease.  · Are obese.  What are the signs or symptoms?  Symptoms of this condition can range from mild to severe. Symptoms may appear any time from 2 to 14 days after being exposed to the virus. They include:  · A fever.  · A cough.  · Difficulty breathing.  · Chills.  · Muscle pains.  · A sore throat.  · Loss of taste or smell.  Some people may also have stomach problems, such as nausea, vomiting, or diarrhea.  Other people may not have any symptoms of COVID-19.  How is this diagnosed?  This condition may be diagnosed based on:  · Your signs and symptoms, especially if:  ? You live in an area with a COVID-19 outbreak.  ? You recently traveled to or from an area where the virus is common.  ? You provide care for or live with a person who was diagnosed with  COVID-19.  · A physical exam.  · Lab tests, which may include:  ? A nasal swab to take a sample of fluid from your nose.  ? A throat swab to take a sample of fluid from your throat.  ? A sample of mucus from your lungs (sputum).  ? Blood tests.  · Imaging tests, which may include, X-rays, CT scan, or ultrasound.  How is this treated?  At present, there is no medicine to treat COVID-19. Medicines that treat other diseases are being used on a trial basis to see if they are effective against COVID-19.  Your health care provider will talk with you about ways to treat your symptoms. For most people, the infection is mild and can be managed at home with rest, fluids, and over-the-counter medicines.  Treatment for a serious infection usually takes places in a hospital intensive care unit (ICU). It may include one or more of the following treatments. These treatments are given until your symptoms improve.  · Receiving fluids and medicines through an IV.  · Supplemental oxygen. Extra oxygen is given through a tube in the nose, a face mask, or a tilley.  · Positioning you to lie on your stomach (prone position). This makes it easier for oxygen to get into the lungs.  · Continuous positive airway pressure (CPAP) or bi-level positive airway pressure (BPAP) machine. This treatment uses mild air pressure to keep the airways open. A tube that is connected to a motor delivers oxygen to the body.  · Ventilator. This treatment moves air into and out of the lungs by using a tube that is placed in your windpipe.  · Tracheostomy. This is a procedure to create a hole in the neck so that a breathing tube can be inserted.  · Extracorporeal membrane oxygenation (ECMO). This procedure gives the lungs a chance to recover by taking over the functions of the heart and lungs. It supplies oxygen to the body and removes carbon dioxide.  Follow these instructions at home:  Lifestyle  · If you are sick, stay home except to get medical care. Your  health care provider will tell you how long to stay home. Call your health care provider before you go for medical care.  · Rest at home as told by your health care provider.  · Do not use any products that contain nicotine or tobacco, such as cigarettes, e-cigarettes, and chewing tobacco. If you need help quitting, ask your health care provider.  · Return to your normal activities as told by your health care provider. Ask your health care provider what activities are safe for you.  General instructions  · Take over-the-counter and prescription medicines only as told by your health care provider.  · Drink enough fluid to keep your urine pale yellow.  · Keep all follow-up visits as told by your health care provider. This is important.  How is this prevented?    There is no vaccine to help prevent COVID-19 infection. However, there are steps you can take to protect yourself and others from this virus.  To protect yourself:   · Do not travel to areas where COVID-19 is a risk. The areas where COVID-19 is reported change often. To identify high-risk areas and travel restrictions, check the CDC travel website: wwwnc.cdc.gov/travel/notices  · If you live in, or must travel to, an area where COVID-19 is a risk, take precautions to avoid infection.  ? Stay away from people who are sick.  ? Wash your hands often with soap and water for 20 seconds. If soap and water are not available, use an alcohol-based hand .  ? Avoid touching your mouth, face, eyes, or nose.  ? Avoid going out in public, follow guidance from your state and local health authorities.  ? If you must go out in public, wear a cloth face covering or face mask.  ? Disinfect objects and surfaces that are frequently touched every day. This may include:  § Counters and tables.  § Doorknobs and light switches.  § Sinks and faucets.  § Electronics, such as phones, remote controls, keyboards, computers, and tablets.  To protect others:  If you have symptoms of  COVID-19, take steps to prevent the virus from spreading to others.  · If you think you have a COVID-19 infection, contact your health care provider right away. Tell your health care team that you think you may have a COVID-19 infection.  · Stay home. Leave your house only to seek medical care. Do not use public transport.  · Do not travel while you are sick.  · Wash your hands often with soap and water for 20 seconds. If soap and water are not available, use alcohol-based hand .  · Stay away from other members of your household. Let healthy household members care for children and pets, if possible. If you have to care for children or pets, wash your hands often and wear a mask. If possible, stay in your own room, separate from others. Use a different bathroom.  · Make sure that all people in your household wash their hands well and often.  · Cough or sneeze into a tissue or your sleeve or elbow. Do not cough or sneeze into your hand or into the air.  · Wear a cloth face covering or face mask.  Where to find more information  · Centers for Disease Control and Prevention: www.cdc.gov/coronavirus/2019-ncov/index.html  · World Health Organization: www.who.int/health-topics/coronavirus  Contact a health care provider if:  · You live in or have traveled to an area where COVID-19 is a risk and you have symptoms of the infection.  · You have had contact with someone who has COVID-19 and you have symptoms of the infection.  Get help right away if:  · You have trouble breathing.  · You have pain or pressure in your chest.  · You have confusion.  · You have bluish lips and fingernails.  · You have difficulty waking from sleep.  · You have symptoms that get worse.  These symptoms may represent a serious problem that is an emergency. Do not wait to see if the symptoms will go away. Get medical help right away. Call your local emergency services (911 in the [place] Do not drive yourself to the hospital. Let the emergency  medical personnel know if you think you have COVID-19.  Summary  · COVID-19 is a respiratory infection that is caused by a virus. It is also known as coronavirus disease or novel coronavirus. It can cause serious infections, such as pneumonia, acute respiratory distress syndrome, acute respiratory failure, or sepsis.  · The virus that causes COVID-19 is contagious. This means that it can spread from person to person through droplets from coughs and sneezes.  · You are more likely to develop a serious illness if you are 65 years of age or older, have a weak immunity, live in a nursing home, or have chronic disease.  · There is no medicine to treat COVID-19. Your health care provider will talk with you about ways to treat your symptoms.  · Take steps to protect yourself and others from infection. Wash your hands often and disinfect objects and surfaces that are frequently touched every day. Stay away from people who are sick and wear a mask if you are sick.  This information is not intended to replace advice given to you by your health care provider. Make sure you discuss any questions you have with your health care provider.  Document Released: 01/23/2020 Document Revised: 05/14/2020 Document Reviewed: 01/23/2020  Elsevier Patient Education © 2020 [employer]      Atrial Fibrillation    Atrial fibrillation is a type of heartbeat that is irregular or fast (rapid). If you have this condition, your heart beats without any order. This makes it hard for your heart to pump blood in a normal way. Having this condition gives you more risk for stroke, heart failure, and other heart problems.  Atrial fibrillation may start all of a sudden and then stop on its own, or it may become a long-lasting problem.  What are the causes?  This condition may be caused by heart conditions, such as:  · High blood pressure.  · Heart failure.  · Heart valve disease.  · Heart surgery.  Other causes include:  · Pneumonia.  · Obstructive sleep  apnea.  · Lung cancer.  · Thyroid disease.  · Drinking too much alcohol.  Sometimes the cause is not known.  What increases the risk?  You are more likely to develop this condition if:  · You smoke.  · You are older.  · You have diabetes.  · You are overweight.  · You have a family history of this condition.  · You exercise often and hard.  What are the signs or symptoms?  Common symptoms of this condition include:  · A feeling like your heart is beating very fast.  · Chest pain.  · Feeling short of breath.  · Feeling light-headed or weak.  · Getting tired easily.  Follow these instructions at home:  Medicines  · Take over-the-counter and prescription medicines only as told by your doctor.  · If your doctor gives you a blood-thinning medicine, take it exactly as told. Taking too much of it can cause bleeding. Taking too little of it does not protect you against clots. Clots can cause a stroke.  Lifestyle         · Do not use any tobacco products. These include cigarettes, chewing tobacco, and e-cigarettes. If you need help quitting, ask your doctor.  · Do not drink alcohol.  · Do not drink beverages that have caffeine. These include coffee, soda, and tea.  · Follow diet instructions as told by your doctor.  · Exercise regularly as told by your doctor.  General instructions  · If you have a condition that causes breathing to stop for a short period of time (apnea), treat it as told by your doctor.  · Keep a healthy weight. Do not use diet pills unless your doctor says they are safe for you. Diet pills may make heart problems worse.  · Keep all follow-up visits as told by your doctor. This is important.  Contact a doctor if:  · You notice a change in the speed, rhythm, or strength of your heartbeat.  · You are taking a blood-thinning medicine and you see more bruising.  · You get tired more easily when you move or exercise.  · You have a sudden change in weight.  Get help right away if:    · You have pain in your  "chest or your belly (abdomen).  · You have trouble breathing.  · You have blood in your vomit, poop, or pee (urine).  · You have any signs of a stroke. \"BE FAST\" is an easy way to remember the main warning signs:  ? B - Balance. Signs are dizziness, sudden trouble walking, or loss of balance.  ? E - Eyes. Signs are trouble seeing or a change in how you see.  ? F - Face. Signs are sudden weakness or loss of feeling in the face, or the face or eyelid drooping on one side.  ? A - Arms. Signs are weakness or loss of feeling in an arm. This happens suddenly and usually on one side of the body.  ? S - Speech. Signs are sudden trouble speaking, slurred speech, or trouble understanding what people say.  ? T - Time. Time to call emergency services. Write down what time symptoms started.  · You have other signs of a stroke, such as:  ? A sudden, very bad headache with no known cause.  ? Feeling sick to your stomach (nausea).  ? Throwing up (vomiting).  ? Jerky movements you cannot control (seizure).  These symptoms may be an emergency. Do not wait to see if the symptoms will go away. Get medical help right away. Call your local emergency services (911 in the [place] Do not drive yourself to the hospital.  Summary  · Atrial fibrillation is a type of heartbeat that is irregular or fast (rapid).  · You are at higher risk of this condition if you smoke, are older, have diabetes, or are overweight.  · Follow your doctor's instructions about medicines, diet, exercise, and follow-up visits.  · Get help right away if you think that you have signs of a stroke.  This information is not intended to replace advice given to you by your health care provider. Make sure you discuss any questions you have with your health care provider.  Document Released: 09/26/2009 Document Revised: 02/21/2019 Document Reviewed: 02/08/2019  [employer] Patient Education © 2020 [employer]    Metoprolol tablets  What is this medicine?  METOPROLOL (me TOE proe " lole) is a beta-blocker. Beta-blockers reduce the workload on the heart and help it to beat more regularly. This medicine is used to treat high blood pressure and to prevent chest pain. It is also used to after a heart attack and to prevent an additional heart attack from occurring.  This medicine may be used for other purposes; ask your health care provider or pharmacist if you have questions.  COMMON BRAND NAME(S): Lopressor  What should I tell my health care provider before I take this medicine?  They need to know if you have any of these conditions:  · diabetes  · heart or vessel disease like slow heart rate, worsening heart failure, heart block, sick sinus syndrome or Raynaud's disease  · kidney disease  · liver disease  · lung or breathing disease, like asthma or emphysema  · pheochromocytoma  · thyroid disease  · an unusual or allergic reaction to metoprolol, other beta-blockers, medicines, foods, dyes, or preservatives  · pregnant or trying to get pregnant  · breast-feeding  How should I use this medicine?  Take this medicine by mouth with a drink of water. Follow the directions on the prescription label. Take this medicine immediately after meals. Take your doses at regular intervals. Do not take more medicine than directed. Do not stop taking this medicine suddenly. This could lead to serious heart-related effects.  Talk to your pediatrician regarding the use of this medicine in children. Special care may be needed.  Overdosage: If you think you have taken too much of this medicine contact a poison control center or emergency room at once.  NOTE: This medicine is only for you. Do not share this medicine with others.  What if I miss a dose?  If you miss a dose, take it as soon as you can. If it is almost time for your next dose, take only that dose. Do not take double or extra doses.  What may interact with this medicine?  This medicine may interact with the following medications:  · certain medicines for  blood pressure, heart disease, irregular heart beat  · certain medicines for depression like monoamine oxidase (MAO) inhibitors, fluoxetine, or paroxetine  · clonidine  · dobutamine  · epinephrine  · isoproterenol  · reserpine  This list may not describe all possible interactions. Give your health care provider a list of all the medicines, herbs, non-prescription drugs, or dietary supplements you use. Also tell them if you smoke, drink alcohol, or use illegal drugs. Some items may interact with your medicine.  What should I watch for while using this medicine?  Visit your doctor or health care professional for regular check ups. Contact your doctor right away if your symptoms worsen. Check your blood pressure and pulse rate regularly. Ask your health care professional what your blood pressure and pulse rate should be, and when you should contact them.  You may get drowsy or dizzy. Do not drive, use machinery, or do anything that needs mental alertness until you know how this medicine affects you. Do not sit or stand up quickly, especially if you are an older patient. This reduces the risk of dizzy or fainting spells. Contact your doctor if these symptoms continue. Alcohol may interfere with the effect of this medicine. Avoid alcoholic drinks.  This medicine may increase blood sugar. Ask your healthcare provider if changes in diet or medicines are needed if you have diabetes.  What side effects may I notice from receiving this medicine?  Side effects that you should report to your doctor or health care professional as soon as possible:  · allergic reactions like skin rash, itching or hives  · cold or numb hands or feet  · depression  · difficulty breathing  · faint  · fever with sore throat  · irregular heartbeat, chest pain  · rapid weight gain  ·   signs and symptoms of high blood sugar such as being more thirsty or hungry or having to urinate more than normal. You may also feel very tired or have blurry  vision.  · swollen legs or ankles  Side effects that usually do not require medical attention (report to your doctor or health care professional if they continue or are bothersome):  · anxiety or nervousness  · change in sex drive or performance  · dry skin  · headache  · nightmares or trouble sleeping  · short term memory loss  · stomach upset or diarrhea  This list may not describe all possible side effects. Call your doctor for medical advice about side effects. You may report side effects to FDA at 7-337-HOS-6862.  Where should I keep my medicine?  Keep out of the reach of children.  Store at room temperature between 15 and 30 degrees C (59 and 86 degrees F). Throw away any unused medicine after the expiration date.  NOTE: This sheet is a summary. It may not cover all possible information. If you have questions about this medicine, talk to your doctor, pharmacist, or health care provider.  © 2020 Elsevier/Gold Standard (2019-10-08 11:15:23)      Depression / Suicide Risk    As you are discharged from this [facility] facility, it is important to learn how to keep safe from harming yourself.    Recognize the warning signs:  · Abrupt changes in personality, positive or negative- including increase in energy   · Giving away possessions  · Change in eating patterns- significant weight changes-  positive or negative  · Change in sleeping patterns- unable to sleep or sleeping all the time   · Unwillingness or inability to communicate  · Depression  · Unusual sadness, discouragement and loneliness  · Talk of wanting to die  · Neglect of personal appearance   · Rebelliousness- reckless behavior  · Withdrawal from people/activities they love  · Confusion- inability to concentrate     If you or a loved one observes any of these behaviors or has concerns about self-harm, here's what you can do:  · Talk about it- your feelings and reasons for harming yourself  · Remove any means that you might use to hurt yourself  (examples: pills, rope, extension cords, firearm)  · Get professional help from the community (Mental Health, Substance Abuse, psychological counseling)  · Do not be alone:Call your Safe Contact- someone whom you trust who will be there for you.  · Call your local CRISIS HOTLINE 888-1968 or 305-736-2346  · Call your local Children's Mobile Crisis Response Team [place] (546) 376-4095 or www.[employer]Sc[employer]  · Call the toll free National Suicide Prevention Hotlines   · National Suicide Prevention Lifeline 505-578-BOSJ (7300)  · National Hope Line Network 800-SUICIDE (905-1034)

## 2021-09-05 NOTE — DISCHARGE SUMMARY
Discharge Summary    CHIEF COMPLAINT ON ADMISSION  Chief Complaint   Patient presents with   • Shortness of Breath   • Fatigue   • Back Pain   • Cough       Reason for Admission  Shortness of Breaht, Weak, Back Pa*     Admission Date  9/4/2021    CODE STATUS  Full Code    HPI & HOSPITAL COURSE  Ania Quevedo is a 75 y.o. female with a past medical history of hypertension, rheumatoid arthritis on immunosuppressive including infliximab and methotrexate, who has received 2 dose vaccination, was planning to get a third 1 who presented 9/4/2021 with worsening shortness of breath and generalized weakness.  Patient reports that she has not been feeling well over the past 10-14 days.  She reports having generalized weakness easy fatigability anorexia, nausea and diarrhea.  Denies having blood or mucus in the diarrhea.  She reports losing 5-10 pounds.  She reports that her shortness of breath is mostly with exertion she does have cough that is painful without hemoptysis.  Denies having orthopnea paroxysmal nocturnal dyspnea.  She denies having lower extremity pain redness or swelling.  Initial plan was to discharge the patient however she suddenly developed worsening shortness of breath monitoring showed evidence for atrial fibrillation with rapid ventricular response with heart rate of around 150s that improved with a dose of intravenous diltiazem.     Patient started on p.o. metoprolol and converted to sinus rhythm shortly after admission.  Her oxygen requirements were titrated off and as per home oxygen evaluation she did not require oxygen at rest or during ambulation.  Patient started on Xarelto on admission but co-pay too high and thus after an extensive discussion regarding anticoagulation, patient will follow up with primary care provider on Tuesday to discuss anticoagulation options.  She was discharged on aspirin.  All results and plan of action were discussed with the patient for her she voiced understanding agree  with the primary care team.  Patient was instructed to quarantine at home for the next 2 weeks and return to emergency department symptoms were to worsen.    Therefore, she is discharged in good and stable condition to home with close outpatient follow-up.    The patient recovered much more quickly than anticipated on admission.    Discharge Date  9/5/2021    FOLLOW UP ITEMS POST DISCHARGE  Primary care provider to discuss anticoagulation options for atrial fibrillation  Primary care provider to follow post hospital discharge care    DISCHARGE DIAGNOSES  Principal Problem:    Atrial fibrillation with rapid ventricular response (HCC) POA: Yes  Active Problems:    Hypokalemia POA: Yes    COVID-19 virus infection POA: Yes    Rheumatoid arthritis involving multiple sites (HCC) POA: Yes    New onset AF (atrial fibrillation) (HCC) POA: Yes    Essential hypertension POA: Yes    Dehydration POA: Yes  Resolved Problems:    * No resolved hospital problems. *      FOLLOW UP  No future appointments.  Jimbo Sanchez M.D.  [address]  [facility] [address]  [phone]      for your appointment on Tuesday 9/7/21 - ask if they can do tele medicine appointment      MEDICATIONS ON DISCHARGE     Medication List      START taking these medications      Instructions   aspirin 81 MG Chew chewable tablet  Commonly known as: ASA   Chew 1 Tablet every day.  Dose: 81 mg     metoprolol tartrate 25 MG Tabs  Commonly known as: LOPRESSOR   Take 0.5 Tablets by mouth 2 times a day.  Dose: 12.5 mg        CONTINUE taking these medications      Instructions   albuterol 108 (90 Base) MCG/ACT Aers inhalation aerosol   Inhale 1 Puff one time.  Dose: 1 Puff     hydroCHLOROthiazide 25 MG Tabs  Commonly known as: HYDRODIURIL   Take 25 mg by mouth every Monday, Wednesday, and Friday.  Dose: 25 mg     HYDROcodone-acetaminophen 5-325 MG Tabs per tablet  Commonly known as: NORCO   Take 0.5-1 Tablets by mouth every four hours as needed (Pain).  Dose:  0.5-1 Tablet     ibuprofen 200 MG Tabs  Commonly known as: MOTRIN   Take 600 mg by mouth every 8 hours as needed for Mild Pain.  Dose: 600 mg     MAGNESIUM PO   Take 1 Tablet by mouth every day.  Dose: 1 Tablet     methotrexate 2.5 MG Tabs   Take 2.5 mg by mouth every 7 days. Patient takes on Wednesday  Dose: 2.5 mg     REMICADE IV   Infuse 1 Dose into a venous catheter every 8 weeks.  Dose: 1 Dose     VITAMIN B-12 PO   Take 1 Tablet by mouth every day.  Dose: 1 Tablet     VITAMIN C PO   Take 1 Tablet by mouth every day.  Dose: 1 Tablet     VITAMIN D PO   Take 1 Tablet by mouth every day.  Dose: 1 Tablet            Allergies  No Known Allergies    DIET  Orders Placed This Encounter   Procedures   • Diet Order Diet: Cardiac     Standing Status:   Standing     Number of Occurrences:   1     Order Specific Question:   Diet:     Answer:   Cardiac [6]       ACTIVITY  As tolerated.  Weight bearing as tolerated    CONSULTATIONS  None    PROCEDURES  None    LABORATORY  Lab Results   Component Value Date    SODIUM 137 09/05/2021    POTASSIUM 3.4 (L) 09/05/2021    CHLORIDE 99 09/05/2021    CO2 26 09/05/2021    GLUCOSE 144 (H) 09/05/2021    BUN 14 09/05/2021    CREATININE 0.67 09/05/2021        Lab Results   Component Value Date    WBC 8.2 09/05/2021    HEMOGLOBIN 15.2 09/05/2021    HEMATOCRIT 44.2 09/05/2021    PLATELETCT 259 09/05/2021        Total time of the discharge process exceeds 34 minutes.

## 2021-09-05 NOTE — PROGRESS NOTES
Telemetry Shift Summary     Rhythm SR-ST/a-fib  HR Range  (PSVT up to 177)  Ectopy F-PVCs  Measurements 0.08/0.10/0.32 when in sinus  ---/0.08/-- when converted to a-fib    Pt converted to a-fib at approximately 0330. Pt asymptomatic. Dr. Low made aware.            Normal Values  Rhythm SR  HR Range    Measurements 0.12-0.20 / 0.06-0.10  / 0.30-0.52

## 2021-09-05 NOTE — ASSESSMENT & PLAN NOTE
I will check echocardiography.  I will check thyroid function.  I will start  metoprolol  I will start PRN metoprolol diltiazem for sustained HR > 120   Continuous cardiac monitoring.   [id number] Score 4  Started on rivaroxaban in the emergency department, will continue

## 2021-09-05 NOTE — ED NOTES
Patient was being prepared for discharge but noted a change to heart rate. Pt denies hx of a fib. MD informed and EKG obtained.

## 2021-09-05 NOTE — H&P
Hospital Medicine History & Physical Note    Date of Service  9/4/2021    Primary Care Physician  Jimbo Sanchez M.D.    Consultants  None     Code Status  Full Code    Chief Complaint  Chief Complaint   Patient presents with   • Shortness of Breath   • Fatigue   • Back Pain   • Cough     History of Presenting Illness  Ania Quevedo is a 75 y.o. female with a past medical history of hypertension, rheumatoid arthritis on immunosuppressive including infliximab and methotrexate, who has received 2 dose vaccination, was planning to get a third 1 who presented 9/4/2021 with worsening shortness of breath and generalized weakness.  Patient reports that she has not been feeling well over the past 10-14 days.  She reports having generalized weakness easy fatigability anorexia, nausea and diarrhea.  Denies having blood or mucus in the diarrhea.  She reports losing 5-10 pounds.  She reports that her shortness of breath is mostly with exertion she does have cough that is painful without hemoptysis.  Denies having orthopnea paroxysmal nocturnal dyspnea.  She denies having lower extremity pain redness or swelling.  Initial plan was to discharge the patient however she suddenly developed worsening shortness of breath monitoring showed evidence for atrial fibrillation with rapid ventricular response with heart rate of around 150s that improved with a dose of intravenous diltiazem.     I discussed the plan of care with patient.    Review of Systems  Review of Systems   Constitutional: Positive for chills, fever, malaise/fatigue and weight loss.   HENT: Negative for congestion and sore throat.    Eyes: Negative for discharge.   Respiratory: Positive for cough, sputum production and shortness of breath. Negative for hemoptysis and stridor.    Cardiovascular: Negative for chest pain.   Gastrointestinal: Positive for diarrhea and nausea.        Anorexia   Genitourinary: Negative for flank pain, hematuria and urgency.    Musculoskeletal: Negative for myalgias.   Skin: Negative for itching.   Neurological: Negative for speech change, focal weakness, seizures and loss of consciousness.   Endo/Heme/Allergies: Does not bruise/bleed easily.   Psychiatric/Behavioral: Negative for hallucinations and suicidal ideas. The patient is not nervous/anxious.      Past Medical History   has a past medical history of Back pain, Hypertension, and Rheumatoid arthritis (HCC).    Surgical History   has a past surgical history that includes dental extraction(s).     Family History  family history includes Heart Disease in her mother.     Social History   reports that she has never smoked. She has never used smokeless tobacco. She reports previous alcohol use. She reports that she does not use drugs.    Allergies  No Known Allergies    Medications  Prior to Admission Medications   Prescriptions Last Dose Informant Patient Reported? Taking?   Ascorbic Acid (VITAMIN C PO) 8/30/2021 at [facility] Patient Yes Yes   Sig: Take 1 Tablet by mouth every day.   Cyanocobalamin (VITAMIN B-12 PO) 8/30/2021 at [facility] Patient Yes Yes   Sig: Take 1 Tablet by mouth every day.   HYDROcodone-acetaminophen (NORCO) 5-325 MG Tab per tablet 9/3/2021 at afternoon Patient Yes No   Sig: Take 0.5-1 Tablets by mouth every four hours as needed (Pain).   MAGNESIUM PO 8/30/2021 at [facility] Patient Yes Yes   Sig: Take 1 Tablet by mouth every day.   VITAMIN D PO 8/30/2021 at [facility] Patient Yes Yes   Sig: Take 1 Tablet by mouth every day.   albuterol 108 (90 Base) MCG/ACT Aero Soln inhalation aerosol A week ago at [facility] Patient Yes Yes   Sig: Inhale 1 Puff one time.   hydroCHLOROthiazide (HYDRODIURIL) 25 MG Tab 8/30/2021 at [facility] Patient Yes No   Sig: Take 25 mg by mouth every Monday, Wednesday, and Friday.   ibuprofen (MOTRIN) 200 MG Tab 9/3/2021 at pm Patient Yes Yes   Sig: Take 600 mg by mouth every 8 hours as needed for Mild Pain.   inFLIXimab (REMICADE IV) 8/10/2021 at [facility] Patient Yes No   Sig: Infuse 1  Dose into a venous catheter every 8 weeks.   methotrexate 2.5 MG Tab 8/25/2021 at [facility] Patient Yes No   Sig: Take 2.5 mg by mouth every 7 days. Patient takes on Wednesday      Facility-Administered Medications: None     Physical Exam  Temp:  [36.1 °C (96.9 °F)-36.6 °C (97.9 °F)] 36.1 °C (96.9 °F)  Pulse:  [] 99  Resp:  [16-24] 20  BP: (131-164)/(68-85) 131/70  SpO2:  [89 %-96 %] 95 %    Physical Exam  Constitutional:       General: She is not in acute distress.  HENT:      Head: Normocephalic and atraumatic.      Right Ear: External ear normal.      Left Ear: External ear normal.      Nose: No congestion or rhinorrhea.      Mouth/Throat:      Mouth: Mucous membranes are dry.      Pharynx: No oropharyngeal exudate or posterior oropharyngeal erythema.   Eyes:      General: No scleral icterus.        Right eye: No discharge.         Left eye: No discharge.      Conjunctiva/sclera: Conjunctivae normal.      Pupils: Pupils are equal, round, and reactive to light.   Cardiovascular:      Rate and Rhythm: Tachycardia present. Rhythm irregular.      Heart sounds: No friction rub. No gallop.    Pulmonary:      Comments: Tachypneic.  On 2 L of oxygen nasal cannula  Abdominal:      General: Abdomen is flat. There is no distension.      Tenderness: There is no guarding.   Musculoskeletal:         General: No swelling.      Cervical back: Neck supple. No rigidity. No muscular tenderness.      Right lower leg: No edema.      Left lower leg: No edema.   Skin:     General: Skin is dry.      Capillary Refill: Capillary refill takes 2 to 3 seconds.      Coloration: Skin is pale. Skin is not jaundiced.      Findings: No bruising or erythema.   Neurological:      Mental Status: She is alert and oriented to person, place, and time.   Psychiatric:         Mood and Affect: Mood normal.         Judgment: Judgment normal.         Laboratory:  Recent Labs     09/04/21  1243   WBC 9.5   RBC 4.82   HEMOGLOBIN 15.1   HEMATOCRIT 45.1   MCV  93.6   MCH 31.3   MCHC 33.5*   RDW 50.0   PLATELETCT 263   MPV 10.4     Recent Labs     09/04/21  1243   SODIUM 136   POTASSIUM 3.3*   CHLORIDE 95*   CO2 26   GLUCOSE 92   BUN 19   CREATININE 0.73   CALCIUM 9.4     Recent Labs     09/04/21  1243   ALTSGPT 17   ASTSGOT 23   ALKPHOSPHAT 72   TBILIRUBIN 0.8   LIPASE 18   GLUCOSE 92         No results for input(s): NTPROBNP in the last 72 hours.      Recent Labs     09/04/21  1243   TROPONINT 9       Imaging:  DX-CHEST-PORTABLE (1 VIEW)   Final Result         No acute cardiac or pulmonary abnormality is identified.      EC-ECHOCARDIOGRAM COMPLETE W/O CONT    (Results Pending)     Assessment/Plan:  I anticipate this patient is appropriate for observation status at this time.    * Atrial fibrillation with rapid ventricular response (HCC)- (present on admission)  Assessment & Plan  Got intravenous diltiazem  in the emergency department with improvement, and conversion to a sinus rhythm.  I will start scheduled metoprolol, consider switching to long-acting form according to clinical course.  I will start PRN metoprolol sustained HR > 120   Continuous cardiac monitoring.      New onset AF (atrial fibrillation) (HCC)- (present on admission)  Assessment & Plan  I will check echocardiography.  I will check thyroid function.  I will start  metoprolol  I will start PRN metoprolol diltiazem for sustained HR > 120   Continuous cardiac monitoring.   [id number] Score 4  Started on rivaroxaban in the emergency department, will continue    Dehydration- (present on admission)  Assessment & Plan  Multifactorial, likely secondary to infection, hydrochlorothiazide use, reduced oral intake.  We will hold hydrochlorothiazide, encourage oral intake, antiemetics as needed.  Judicious use of intravenous fluids, monitor volume status closely    Essential hypertension- (present on admission)  Assessment & Plan  We will switch hydrochlorothiazide to metoprolol given her dehydration and new onset  atrial fibrillation    Rheumatoid arthritis involving multiple sites (HCC)- (present on admission)  Assessment & Plan  On immunosuppressants including methotrexate and infliximab  Not currently in exacerbation, multimodal pain control    Hypokalemia- (present on admission)  Assessment & Plan  Replacing, checking magnesium   Continue to monitor replace as needed     VTE prophylaxis: SCDs/TEDs and therapeutic anticoagulation with Rivaroxaban

## 2021-09-05 NOTE — PROGRESS NOTES
Report received from [facility] ED. Pt arrived to unit via gurney.  Tele monitor applied, vitals taken. Pt assessed. A&Ox4. Admit profile and med rec completed. Discussed POC with pt, including monitoring, trending lab levels and pertinent tests. Welcome folder provided and discussed. Communication board filled out. Questions and concerns addressed - patient verbalized understanding. Fall precautions in place. Pt demonstrates ability to use call light appropriately. Pt left in lowest position. Bed locked and low. Bed alarm on.

## 2021-09-05 NOTE — CARE PLAN
The patient is Stable - Low risk of patient condition declining or worsening    Shift Goals  Clinical Goals: maintain oxygen level above 90%, wean off oxygen, medicate for heart rate   Patient Goals: breathe better    Progress made toward(s) clinical / shift goals:  patient is now on room air and will have walking O2 completed.    Patient is not progressing towards the following goals: Patient's HR is in A fib up to 190s with ambulation, held metoprolol given this morning per Dr. Lester, will continue to monitor heart rate.    Problem: Knowledge Deficit - Standard  Goal: Patient and family/care givers will demonstrate understanding of plan of care, disease process/condition, diagnostic tests and medications  Outcome: Progressing  Discussed plan of care with patient including medication given for rate control, oxygen monitoring and walking O2 completed, and safety with ambulation. Allowed time for questions, patient agreed and verbalized understanding.      Problem: Pain - Standard  Goal: Alleviation of pain or a reduction in pain to the patient’s comfort goal  Outcome: Progressing  Patient denies any pain at the time of assessment, will continue to monitor and medicate per MAR.

## 2021-09-05 NOTE — PROGRESS NOTES
Pt discharged to home. Discharge instructions provided to pt. Pt verbalizes understanding. Pt states all questions have been answered. Signed copy in chart. Prescriptions sent to [facility]. Pt states that all personal belongings are in possession. Pt off unit via wheelchair, escorted by tranport.

## 2021-09-05 NOTE — PROGRESS NOTES
Notified Dr. Lester of patient's walking 02 being completed and that heart rate is still not controlled, patient's HR got up to 164 during ambulation, patient continues to be asymptomatic.

## 2021-09-05 NOTE — DISCHARGE PLANNING
Anticipated Discharge Disposition: Home     Action: Pt discussed during IDT rounds. Pt on room air. Xarelto script sent to pt's pharmacy [facility] off of [facility] [address]- [phone]. LSW informed that medication does not require a prior auth. Pt's copay for xarelto is $225.91    Barriers to Discharge: None     Plan: LSW to assist as needed     Addendum 1301  LSW called pt- [phone] and informed that Xarelto is $225.91. Pt stated she cannot afford the medication. LSW messaged Dr. Lester to provide update and to see if there is an alternative.

## 2021-09-05 NOTE — PROGRESS NOTES
Telemetry Shift Summary    R[place] [address]  HR Range 96-10 when in A fib, 76-77 when in SR  Ectopy o PVCs, r Couplets  Measurements .18/.08/.38        Normal Values  Rhythm SR  HR Range    Measurements 0.12-0.20 / 0.06-0.10  / 0.30-0.52

## 2021-09-21 ENCOUNTER — HOSPITAL ENCOUNTER (OUTPATIENT)
Dept: LAB | Facility: MEDICAL CENTER | Age: 76
End: 2021-09-21
Attending: FAMILY MEDICINE
Payer: MEDICARE

## 2021-09-21 ENCOUNTER — OFFICE VISIT (OUTPATIENT)
Dept: MEDICAL GROUP | Facility: LAB | Age: 76
End: 2021-09-21
Payer: MEDICARE

## 2021-09-21 ENCOUNTER — TELEPHONE (OUTPATIENT)
Dept: MEDICAL GROUP | Facility: LAB | Age: 76
End: 2021-09-21

## 2021-09-21 VITALS
SYSTOLIC BLOOD PRESSURE: 120 MMHG | RESPIRATION RATE: 12 BRPM | WEIGHT: 193 LBS | OXYGEN SATURATION: 94 % | BODY MASS INDEX: 35.51 KG/M2 | HEART RATE: 77 BPM | DIASTOLIC BLOOD PRESSURE: 64 MMHG | HEIGHT: 62 IN | TEMPERATURE: 97.2 F

## 2021-09-21 DIAGNOSIS — U07.1 COVID-19 VIRUS INFECTION: ICD-10-CM

## 2021-09-21 DIAGNOSIS — E87.6 HYPOKALEMIA: ICD-10-CM

## 2021-09-21 DIAGNOSIS — M06.09 RHEUMATOID ARTHRITIS OF MULTIPLE SITES WITH NEGATIVE RHEUMATOID FACTOR (HCC): ICD-10-CM

## 2021-09-21 PROBLEM — I48.91 AF (ATRIAL FIBRILLATION) (HCC): Status: RESOLVED | Noted: 2021-09-04 | Resolved: 2021-09-21

## 2021-09-21 PROBLEM — I48.91 ATRIAL FIBRILLATION WITH RAPID VENTRICULAR RESPONSE (HCC): Status: RESOLVED | Noted: 2021-09-04 | Resolved: 2021-09-21

## 2021-09-21 LAB
ANION GAP SERPL CALC-SCNC: 10 MMOL/L (ref 7–16)
BUN SERPL-MCNC: 15 MG/DL (ref 8–22)
CALCIUM SERPL-MCNC: 9.5 MG/DL (ref 8.5–10.5)
CHLORIDE SERPL-SCNC: 100 MMOL/L (ref 96–112)
CO2 SERPL-SCNC: 27 MMOL/L (ref 20–33)
CREAT SERPL-MCNC: 0.62 MG/DL (ref 0.5–1.4)
FASTING STATUS PATIENT QL REPORTED: NORMAL
GLUCOSE SERPL-MCNC: 96 MG/DL (ref 65–99)
POTASSIUM SERPL-SCNC: 3.6 MMOL/L (ref 3.6–5.5)
SODIUM SERPL-SCNC: 137 MMOL/L (ref 135–145)

## 2021-09-21 PROCEDURE — 99213 OFFICE O/P EST LOW 20 MIN: CPT | Performed by: FAMILY MEDICINE

## 2021-09-21 PROCEDURE — 80048 BASIC METABOLIC PNL TOTAL CA: CPT

## 2021-09-21 PROCEDURE — 36415 COLL VENOUS BLD VENIPUNCTURE: CPT

## 2021-09-21 RX ORDER — GUAIFENESIN 600 MG/1
600 TABLET, EXTENDED RELEASE ORAL EVERY 12 HOURS
COMMUNITY

## 2021-09-21 ASSESSMENT — FIBROSIS 4 INDEX: FIB4 SCORE: 1.56

## 2021-09-21 ASSESSMENT — PATIENT HEALTH QUESTIONNAIRE - PHQ9: CLINICAL INTERPRETATION OF PHQ2 SCORE: 0

## 2021-09-21 NOTE — TELEPHONE ENCOUNTER
Phone Number Called: [phone] (home)     Call outcome: Unable to leave message, mailbox full.     Message: No answer, unable to leave message, mailbox full.

## 2021-09-21 NOTE — PROGRESS NOTES
Subjective:     Chief Complaint   Patient presents with   • Establish Care         HPI:   Ania presents today with hospital follow up. Was admitted 9/4/21, and discharge 9/6/21. COVID positive, then severe SOB, needed O2, then ended up with A-fib with RVR. Converted with diltiazem, sent home on metoprolol. She reports her  still is having issues with COVID and needing oxygen.     She does have ah/o rheumatoid arthritis. Diagnosed 27 years ago. Is on Remicade IV and also methotrexate orally. Took a long time to get diagnosed and then was controlled for a time, but then doctor retired and her meds changed.   She is seeing Dr Torres now for rheum. Has infusions every 8 weeks.     Odalys urias, goes to [facility]. Leaving October 9th, comes back May conchis, Lupe time.    She does have a stent in her heart, 2013. Sees cardiology in [place]. Was on a lot of meds, reports statin intolerance. Baby aspirin for blood thinner.   Is taking her metoprolol she was discharged on.   She did have a slightly low potassium while admitted. She thinks she got potassium IV then.   She does continue to cough. CXR and echo done in the hospital were largely normal. Good EF.     Sees pain specialist for her back. This is in [place]. Does use stool softeners.     Current Outpatient Medications Ordered in Epic   Medication Sig Dispense Refill   • aspirin (ASA) 81 MG Chew Tab chewable tablet Chew 1 Tablet every day. 30 Tablet 0   • methotrexate 2.5 MG Tab Take 2.5 mg by mouth every 7 days. Patient takes on Wednesday     • inFLIXimab (REMICADE IV) Infuse 1 Dose into a venous catheter every 8 weeks.     • hydroCHLOROthiazide (HYDRODIURIL) 25 MG Tab Take 25 mg by mouth every Monday, Wednesday, and Friday.     • HYDROcodone-acetaminophen (NORCO) 5-325 MG Tab per tablet Take 0.5-1 Tablets by mouth every four hours as needed (Pain).     • Ascorbic Acid (VITAMIN C PO) Take 1 Tablet by mouth every day.     • Cyanocobalamin (VITAMIN B-12 PO) Take 1 Tablet  "by mouth every day.     • VITAMIN D PO Take 1 Tablet by mouth every day.     • MAGNESIUM PO Take 1 Tablet by mouth every day.     • ibuprofen (MOTRIN) 200 MG Tab Take 600 mg by mouth every 8 hours as needed for Mild Pain.     • albuterol 108 (90 Base) MCG/ACT Aero Soln inhalation aerosol Inhale 1 Puff one time.     • metoprolol tartrate (LOPRESSOR) 25 MG Tab Take 0.5 Tablets by mouth 2 times a day. 60 Tablet 0     No current Epic-ordered facility-administered medications on file.           ROS:  Gen: no fevers/chills, no changes in weight  Eyes: no changes in vision  ENT: no sore throat, no hearing loss, no bloody nose  Pulm: no sob, no cough  CV: no chest pain, no palpitations  GI: no nausea/vomiting, no diarrhea  : no dysuria  MSk: no myalgias  Skin: no rash        Objective:     Exam:  /64 (BP Location: Right arm, Patient Position: Sitting, BP Cuff Size: Adult)   Pulse 77   Temp 36.2 °C (97.2 °F)   Resp 12   Ht 1.575 m (5' 2\")   Wt 87.5 kg (193 lb)   SpO2 94%   BMI 35.30 kg/m²  Body mass index is 35.3 kg/m².    Gen: Alert and oriented, No apparent distress.  HEENT: NCAT, EOMI, TMs are normal bilaterally, oropharynx is mildly erythematous, Mallampati 4, multiple fillings present.  Dentition is fair.  Neck: Neck is supple without lymphadenopathy.  Lungs: Normal effort, CTA bilaterally, no wheezes, rhonchi, or rales  CV: Regular rate and rhythm. No murmurs, rubs, or gallops.  Ext: No clubbing, cyanosis, edema.      Assessment & Plan:     75 y.o. female with the following -     1. Rheumatoid arthritis of multiple sites with negative rheumatoid factor (HCC)  Chronic, stable.  She was continue to follow-up with her rheumatology provider in [place].  She does do Remicade every 8 weeks and also methotrexate.  She denies any need for refills at this time.    2. COVID-19 virus infection  Discussed history of Covid infection.  I do think that she should receive her booster in the future as well given the fact " that she had 2 regular vaccinations and due to her immunosuppressants still got Covid and ended up in the hospital.  She will follow up with her providers in [facility].  I do think that it was a strain from Covid on her lungs which cause them A. fib with RVR.  She is quite regular today so continue medications as discharged.  Okay to continue with baby aspirin.    3. Hypokalemia  Discussed low potassium while admitted.  We will recheck this today since she is here.  Continue with metoprolol as is and follow-up with cardiology in [place].  She leaves October 9 and so this is not too far in advance.  If there is any issues she certainly let us know before then.  - Basic Metabolic Panel; Future        No follow-ups on file.    Please note that this dictation was created using voice recognition software. I have made every reasonable attempt to correct obvious errors, but I expect that there are errors of grammar and possibly content that I did not discover before finalizing the note.

## 2022-08-30 ENCOUNTER — HOSPITAL ENCOUNTER (OUTPATIENT)
Dept: RADIOLOGY | Facility: MEDICAL CENTER | Age: 77
End: 2022-08-30
Attending: STUDENT IN AN ORGANIZED HEALTH CARE EDUCATION/TRAINING PROGRAM
Payer: MEDICARE

## 2022-08-30 ENCOUNTER — OFFICE VISIT (OUTPATIENT)
Dept: RHEUMATOLOGY | Facility: MEDICAL CENTER | Age: 77
End: 2022-08-30
Attending: STUDENT IN AN ORGANIZED HEALTH CARE EDUCATION/TRAINING PROGRAM
Payer: MEDICARE

## 2022-08-30 ENCOUNTER — HOSPITAL ENCOUNTER (OUTPATIENT)
Dept: LAB | Facility: MEDICAL CENTER | Age: 77
End: 2022-08-30
Attending: STUDENT IN AN ORGANIZED HEALTH CARE EDUCATION/TRAINING PROGRAM
Payer: MEDICARE

## 2022-08-30 VITALS
HEIGHT: 62 IN | OXYGEN SATURATION: 96 % | DIASTOLIC BLOOD PRESSURE: 70 MMHG | BODY MASS INDEX: 37.84 KG/M2 | SYSTOLIC BLOOD PRESSURE: 118 MMHG | RESPIRATION RATE: 16 BRPM | HEART RATE: 85 BPM | WEIGHT: 205.6 LBS | TEMPERATURE: 97.5 F

## 2022-08-30 DIAGNOSIS — Z79.60 LONG-TERM USE OF IMMUNOSUPPRESSANT MEDICATION: ICD-10-CM

## 2022-08-30 DIAGNOSIS — M06.9 RHEUMATOID ARTHRITIS INVOLVING MULTIPLE JOINTS (HCC): ICD-10-CM

## 2022-08-30 LAB
ALBUMIN SERPL BCP-MCNC: 4 G/DL (ref 3.2–4.9)
ALBUMIN/GLOB SERPL: 1.1 G/DL
ALP SERPL-CCNC: 69 U/L (ref 30–99)
ALT SERPL-CCNC: 16 U/L (ref 2–50)
ANION GAP SERPL CALC-SCNC: 11 MMOL/L (ref 7–16)
AST SERPL-CCNC: 21 U/L (ref 12–45)
BASOPHILS # BLD AUTO: 0.5 % (ref 0–1.8)
BASOPHILS # BLD: 0.04 K/UL (ref 0–0.12)
BILIRUB SERPL-MCNC: 0.6 MG/DL (ref 0.1–1.5)
BUN SERPL-MCNC: 14 MG/DL (ref 8–22)
CALCIUM SERPL-MCNC: 9.9 MG/DL (ref 8.5–10.5)
CHLORIDE SERPL-SCNC: 99 MMOL/L (ref 96–112)
CO2 SERPL-SCNC: 26 MMOL/L (ref 20–33)
CREAT SERPL-MCNC: 0.75 MG/DL (ref 0.5–1.4)
CRP SERPL HS-MCNC: <0.3 MG/DL (ref 0–0.75)
EOSINOPHIL # BLD AUTO: 0.21 K/UL (ref 0–0.51)
EOSINOPHIL NFR BLD: 2.8 % (ref 0–6.9)
ERYTHROCYTE [DISTWIDTH] IN BLOOD BY AUTOMATED COUNT: 50.2 FL (ref 35.9–50)
ERYTHROCYTE [SEDIMENTATION RATE] IN BLOOD BY WESTERGREN METHOD: 8 MM/HOUR (ref 0–25)
GFR SERPLBLD CREATININE-BSD FMLA CKD-EPI: 81 ML/MIN/1.73 M 2
GLOBULIN SER CALC-MCNC: 3.5 G/DL (ref 1.9–3.5)
GLUCOSE SERPL-MCNC: 95 MG/DL (ref 65–99)
HCT VFR BLD AUTO: 43.5 % (ref 37–47)
HGB BLD-MCNC: 14.8 G/DL (ref 12–16)
IMM GRANULOCYTES # BLD AUTO: 0.01 K/UL (ref 0–0.11)
IMM GRANULOCYTES NFR BLD AUTO: 0.1 % (ref 0–0.9)
LYMPHOCYTES # BLD AUTO: 2.51 K/UL (ref 1–4.8)
LYMPHOCYTES NFR BLD: 33.2 % (ref 22–41)
MCH RBC QN AUTO: 31.4 PG (ref 27–33)
MCHC RBC AUTO-ENTMCNC: 34 G/DL (ref 33.6–35)
MCV RBC AUTO: 92.4 FL (ref 81.4–97.8)
MONOCYTES # BLD AUTO: 0.63 K/UL (ref 0–0.85)
MONOCYTES NFR BLD AUTO: 8.3 % (ref 0–13.4)
NEUTROPHILS # BLD AUTO: 4.16 K/UL (ref 2–7.15)
NEUTROPHILS NFR BLD: 55.1 % (ref 44–72)
NRBC # BLD AUTO: 0 K/UL
NRBC BLD-RTO: 0 /100 WBC
PLATELET # BLD AUTO: 266 K/UL (ref 164–446)
PMV BLD AUTO: 9.8 FL (ref 9–12.9)
POTASSIUM SERPL-SCNC: 3.6 MMOL/L (ref 3.6–5.5)
PROT SERPL-MCNC: 7.5 G/DL (ref 6–8.2)
RBC # BLD AUTO: 4.71 M/UL (ref 4.2–5.4)
RHEUMATOID FACT SER IA-ACNC: 10 IU/ML (ref 0–14)
SODIUM SERPL-SCNC: 136 MMOL/L (ref 135–145)
WBC # BLD AUTO: 7.6 K/UL (ref 4.8–10.8)

## 2022-08-30 PROCEDURE — 77077 JOINT SURVEY SINGLE VIEW: CPT

## 2022-08-30 PROCEDURE — 85652 RBC SED RATE AUTOMATED: CPT

## 2022-08-30 PROCEDURE — 99212 OFFICE O/P EST SF 10 MIN: CPT | Performed by: STUDENT IN AN ORGANIZED HEALTH CARE EDUCATION/TRAINING PROGRAM

## 2022-08-30 PROCEDURE — 36415 COLL VENOUS BLD VENIPUNCTURE: CPT

## 2022-08-30 PROCEDURE — 85025 COMPLETE CBC W/AUTO DIFF WBC: CPT

## 2022-08-30 PROCEDURE — 86812 HLA TYPING A B OR C: CPT | Mod: GA

## 2022-08-30 PROCEDURE — 86431 RHEUMATOID FACTOR QUANT: CPT

## 2022-08-30 PROCEDURE — 86200 CCP ANTIBODY: CPT

## 2022-08-30 PROCEDURE — 80053 COMPREHEN METABOLIC PANEL: CPT

## 2022-08-30 PROCEDURE — 86140 C-REACTIVE PROTEIN: CPT

## 2022-08-30 PROCEDURE — 99204 OFFICE O/P NEW MOD 45 MIN: CPT | Performed by: STUDENT IN AN ORGANIZED HEALTH CARE EDUCATION/TRAINING PROGRAM

## 2022-08-30 RX ORDER — FOLIC ACID 1 MG/1
1 TABLET ORAL DAILY
COMMUNITY

## 2022-08-30 RX ORDER — INFLIXIMAB 100 MG/10ML
600 INJECTION, POWDER, LYOPHILIZED, FOR SOLUTION INTRAVENOUS
COMMUNITY
Start: 2022-06-10 | End: 2022-08-30

## 2022-08-30 ASSESSMENT — FIBROSIS 4 INDEX: FIB4 SCORE: 1.58

## 2022-08-30 NOTE — PATIENT INSTRUCTIONS
AFTER VISIT INSTRUCTIONS    Below are important information to help you navigate your healthcare needs and help us serve you safely and effectively:  If laboratory tests and/or imaging studies were ordered, remember to go get them done.  If new prescriptions or refills were sent to the pharmacy, remember to go pick them up.  Take your medications exactly as prescribed unless instructed otherwise.  If there are significant findings on your lab tests and imaging studies that warrant further action, I will notify you with explanations via [employer] or phone call, otherwise you can view them on Lemon[employer] and let me know if you have any questions.  Sign up for W4 if you have not already done so, in order to have access to the results of your lab tests and imaging studies, and to be able to send and receive messages from me.  Note that W4 messages are typically read during office hours only and may take 1-7 days before a response depending on the urgency of the situation and how busy my schedule is.  In general, W4 messaging is for non-urgent matters that do not require immediate attention, so for urgent matters that cannot wait, you are advised to go to an urgent care.

## 2022-08-30 NOTE — PROGRESS NOTES
[facility] RHEUMATOLOGY ? [facility]  [address], [address], [place] [address]  Phone: [phone] ? Fax: [phone]    RHEUMATOLOGY NEW PATIENT VISIT NOTE      DATE OF SERVICE: 08/30/2022    REFERRING PROVIDER:  Dano Torres M.D.  [address] [facility]  [place] [address]    MEDICAL RECORD NUMBER:  [id number]      SUBJECTIVE:     CHIEF COMPLAINT:   Chief Complaint   Patient presents with    New Patient     Rheumatoid arthritis       HISTORY OF PRESENT ILLNESS:  Ania Quevedo is a 76 y.o. female with pertinent history notable for rheumatoid arthritis since 1978, osteoarthritis of multiple joints, and osteopenia of femur/forearm (based on DEXA scan in 1/2021) among other comorbidities. Previously under the care of a local rheumatologist who has retired (Dr. Dano Torres), she presents to establish care for continued management of her condition. Reports joint/muscle pain in her wrists, mid/lower back, ankles and feet. These are associated with variable degrees of morning stiffness that improves with activity but can worsen with much activity. Notes other symptoms including body aches, weakness and numbness of distal legs/feet, but otherwise feels that she is doing quite well on her current regimen of methotrexate and Remicade (last infusion on 8/24/22).    Pertinent treatment history as of 8/2022: Prednisone taper (helpful), hydroxychloroquine (initially helpful but became ineffective), methotrexate 15 mg oral weekly (1999-present, helpful), Remicade 600 mg IV infusion every 8 weeks (1999-present, helpful).    REVIEW OF SYSTEMS:  Except as noted in the history above, a complete review of systems with emphasis on autoimmune inflammatory conditions was otherwise negative for any significant symptoms.      ACTIVE PROBLEM LIST:  Patient Active Problem List   Diagnosis    Hypokalemia    COVID-19 virus infection    Rheumatoid arthritis involving multiple sites (HCC)    Essential hypertension    Dehydration     Long-term use of immunosuppressant medication   No problem-specific Assessment & Plan notes found for this encounter.      PAST MEDICAL HISTORY:  Past Medical History:   Diagnosis Date    Back pain     Hypertension     Rheumatoid arthritis (HCC)        PAST SURGICAL HISTORY:  Past Surgical History:   Procedure Laterality Date    DENTAL EXTRACTION(S)         MEDICATIONS:  Current Outpatient Medications   Medication Sig Dispense Refill    folic acid (FOLVITE) 1 MG Tab Take 1 mg by mouth every day.      guaiFENesin ER (MUCINEX) 600 MG TABLET SR 12 HR Take 600 mg by mouth every 12 hours.      Cetirizine HCl 10 MG Cap Take  by mouth.      aspirin (ASA) 81 MG Chew Tab chewable tablet Chew 1 Tablet every day. 30 Tablet 0    methotrexate 2.5 MG Tab Take 2.5 mg by mouth every 7 days. Patient takes on Wednesday      inFLIXimab (REMICADE IV) Infuse 1 Dose into a venous catheter every 8 weeks.      hydroCHLOROthiazide (HYDRODIURIL) 25 MG Tab Take 25 mg by mouth every Monday, Wednesday, and Friday.      HYDROcodone-acetaminophen (NORCO) 5-325 MG Tab per tablet Take 0.5-1 Tablets by mouth every four hours as needed (Pain).      Ascorbic Acid (VITAMIN C PO) Take 1 Tablet by mouth every day.      Cyanocobalamin (VITAMIN B-12 PO) Take 1 Tablet by mouth every day.      VITAMIN D PO Take 1 Tablet by mouth every day.      MAGNESIUM PO Take 1 Tablet by mouth every day.      ibuprofen (MOTRIN) 200 MG Tab Take 600 mg by mouth every 8 hours as needed for Mild Pain.      albuterol 108 (90 Base) MCG/ACT Aero Soln inhalation aerosol Inhale 1 Puff one time.      metoprolol tartrate (LOPRESSOR) 25 MG Tab Take 0.5 Tablets by mouth 2 times a day. (Patient not taking: Reported on 8/30/2022) 60 Tablet 0     No current facility-administered medications for this visit.       ALLERGIES:   Allergies   Allergen Reactions    Bacitracin        IMMUNIZATIONS:  Immunization History   Administered Date(s) Administered    Hep A/HEP B Combined Vaccine  "(TwinRix) 09/16/2004, 11/19/2004, 10/18/2006    IPV 10/16/1991    Influenza Seasonal Injectable - Historical Data 10/20/2014    Influenza Vaccine Quad Inj (Pf) 09/10/2020    Pneumococcal polysaccharide vaccine (PPSV-23) 11/19/2004    TD Vaccine 09/16/2004    Typhoid Vaccine 09/16/2004, 10/18/2006       SOCIAL HISTORY:   Social History     Tobacco Use    Smoking status: Former     Types: Cigarettes    Smokeless tobacco: Never   Substance Use Topics    Alcohol use: Not Currently    Drug use: Never       FAMILY HISTORY:  Family History   Problem Relation Age of Onset    Heart Disease Mother         OBJECTIVE:     Vital Signs: /70 (BP Location: Right arm, Patient Position: Sitting, BP Cuff Size: Adult)   Pulse 85   Temp 36.4 °C (97.5 °F) (Temporal)   Resp 16   Ht 1.575 m (5' 2\")   Wt 93.3 kg (205 lb 9.6 oz)   SpO2 96% Body mass index is 37.6 kg/m².    General: Appears well and comfortable  Eyes: No scleral or conjunctival lesions  ENT: No apparent oral or nasal lesions  Head/Neck: No apparent scalp or neck lesions  Cardiovascular: Regular rate and rhythm; no pericardial rubs  Respiratory: Breathing quiet and unlabored; no rales or pleural rubs  Gastrointestinal: No organomegaly or abdominal masses  Integumentary: No significant cutaneous lesions or discolorations  Musculoskeletal: Mild tenderness of wrists and ankles; no definite periarticular soft tissue swelling, warmth, erythema, or overt signs of synovitis; no significant restriction in range of motion of joints examined  Neurologic: No focal sensory or motor deficits  Psychiatric: Mood and affect appropriate      LABORATORY RESULTS REVIEWED AND INTERPRETED BY ME:  Lab Results   Component Value Date/Time    SEDRATEWES 22 09/05/2021 12:49 AM    CREACTPROT 2.80 (H) 09/05/2021 12:49 AM     Lab Results   Component Value Date/Time    TSHULTRASEN 2.670 09/05/2021 12:49 AM     Lab Results   Component Value Date/Time    HEPBSAG Non-Reactive 09/05/2021 12:49 AM "    HEPBCORIGM Non-Reactive 09/05/2021 12:49 AM    HEPCAB Non-Reactive 09/05/2021 12:49 AM     Lab Results   Component Value Date/Time    ASTSGOT 21 09/05/2021 12:49 AM    ALTSGPT 16 09/05/2021 12:49 AM    ALKPHOSPHAT 73 09/05/2021 12:49 AM    TBILIRUBIN 0.7 09/05/2021 12:49 AM    TOTPROTEIN 6.7 09/05/2021 12:49 AM    ALBUMIN 3.2 09/05/2021 12:49 AM     Lab Results   Component Value Date/Time    SODIUM 137 09/21/2021 08:02 AM    POTASSIUM 3.6 09/21/2021 08:02 AM    CHLORIDE 100 09/21/2021 08:02 AM    CO2 27 09/21/2021 08:02 AM    GLUCOSE 96 09/21/2021 08:02 AM    BUN 15 09/21/2021 08:02 AM    CREATININE 0.62 09/21/2021 08:02 AM    CALCIUM 9.5 09/21/2021 08:02 AM    MAGNESIUM 1.9 09/05/2021 12:49 AM     Lab Results   Component Value Date/Time    WBC 8.2 09/05/2021 12:49 AM    RBC 4.83 09/05/2021 12:49 AM    HEMOGLOBIN 15.2 09/05/2021 12:49 AM    HEMATOCRIT 44.2 09/05/2021 12:49 AM    MCV 91.5 09/05/2021 12:49 AM    MCH 31.5 09/05/2021 12:49 AM    MCHC 34.4 09/05/2021 12:49 AM    RDW 47.8 09/05/2021 12:49 AM    PLATELETCT 259 09/05/2021 12:49 AM    MPV 9.7 09/05/2021 12:49 AM    NEUTS 4.82 09/05/2021 12:49 AM    LYMPHOCYTES 30.60 09/05/2021 12:49 AM    MONOCYTES 8.70 09/05/2021 12:49 AM    EOSINOPHILS 1.20 09/05/2021 12:49 AM    BASOPHILS 0.40 09/05/2021 12:49 AM       RADIOLOGY RESULTS REVIEWED AND INTERPRETED BY ME:  No loadable results found in the system.      All relevant laboratory and imaging results reported on this note were reviewed and interpreted by me.      ASSESSMENT AND PLAN:     Ania Fani Myres is a 76 y.o. female with history as noted above whose presentation merits the following diagnostic and clinical status impressions and recommendations:    1. Rheumatoid arthritis involving multiple joints (HCC)  Clinical picture suggests low/remission-range disease activity well-controlled on the current regimen of methotrexate and Remicade infusion, so no need for escalation of treatment at this time. That said,  given the variability of discordance between immunologic activity and clinical disease manifestations, need to ascertain her serologic and radiographic status for complete assessment to guide risk stratification and prognostication.  - RHEUMATOID ARTHRITIS FACTOR; Future  - CCP ANTIBODY; Future  - HLA-B27; Future  - Sed Rate; Future  - CRP QUANTITIVE (NON-CARDIAC); Future  - DX-JOINT SURVEY-HANDS SINGLE VIEW; Future  - DX-JOINT SURVEY-FEET SINGLE VIEW; Future  - Continue methotrexate 15 mg oral weekly with folic acid 1 mg daily  - Continue Remicade 600 mg IV infusion every 8 weeks (therapy plan need to be ordered after reviewing labs and imaging studies)    2. Long-term use of immunosuppressant medication  No current history, physical findings, or laboratory evidence to suggest significant adverse drug effects or opportunistic infections.  - CBC WITH DIFFERENTIAL; Future  - Comp Metabolic Panel; Future  - Need to confirm/address age-appropriate vaccines      FOLLOW-UP: Return in about 9 months (around 5/30/2023) for Short.           Thank you for giving me the opportunity to participate in the care of Ania Quevedo.    Bran Frias MD, MS  Rheumatologist, [facility] ? [facility]  , Department of Internal Medicine  [facility] ? [facility]

## 2022-09-01 LAB
CCP IGG SERPL-ACNC: 69 UNITS (ref 0–19)
HLA-B27 QL FC: NEGATIVE

## 2023-05-18 ENCOUNTER — APPOINTMENT (OUTPATIENT)
Dept: RHEUMATOLOGY | Facility: MEDICAL CENTER | Age: 78
End: 2023-05-18
Attending: STUDENT IN AN ORGANIZED HEALTH CARE EDUCATION/TRAINING PROGRAM
Payer: MEDICARE

## 2023-06-22 ENCOUNTER — OFFICE VISIT (OUTPATIENT)
Dept: RHEUMATOLOGY | Facility: MEDICAL CENTER | Age: 78
End: 2023-06-22
Attending: STUDENT IN AN ORGANIZED HEALTH CARE EDUCATION/TRAINING PROGRAM
Payer: MEDICARE

## 2023-06-22 VITALS
DIASTOLIC BLOOD PRESSURE: 72 MMHG | RESPIRATION RATE: 18 BRPM | HEIGHT: 62 IN | HEART RATE: 72 BPM | WEIGHT: 201 LBS | TEMPERATURE: 97.1 F | OXYGEN SATURATION: 98 % | SYSTOLIC BLOOD PRESSURE: 118 MMHG | BODY MASS INDEX: 36.99 KG/M2

## 2023-06-22 DIAGNOSIS — D84.9 IMMUNOSUPPRESSED STATUS (HCC): ICD-10-CM

## 2023-06-22 DIAGNOSIS — M05.9 SEROPOSITIVE RHEUMATOID ARTHRITIS (HCC): ICD-10-CM

## 2023-06-22 DIAGNOSIS — M15.9 PRIMARY OSTEOARTHRITIS INVOLVING MULTIPLE JOINTS: ICD-10-CM

## 2023-06-22 PROBLEM — M15.0 PRIMARY OSTEOARTHRITIS INVOLVING MULTIPLE JOINTS: Status: ACTIVE | Noted: 2023-06-22

## 2023-06-22 PROCEDURE — 3074F SYST BP LT 130 MM HG: CPT | Performed by: STUDENT IN AN ORGANIZED HEALTH CARE EDUCATION/TRAINING PROGRAM

## 2023-06-22 PROCEDURE — 99212 OFFICE O/P EST SF 10 MIN: CPT | Performed by: STUDENT IN AN ORGANIZED HEALTH CARE EDUCATION/TRAINING PROGRAM

## 2023-06-22 PROCEDURE — 3078F DIAST BP <80 MM HG: CPT | Performed by: STUDENT IN AN ORGANIZED HEALTH CARE EDUCATION/TRAINING PROGRAM

## 2023-06-22 PROCEDURE — 99214 OFFICE O/P EST MOD 30 MIN: CPT | Performed by: STUDENT IN AN ORGANIZED HEALTH CARE EDUCATION/TRAINING PROGRAM

## 2023-06-22 RX ORDER — METHYLPREDNISOLONE SODIUM SUCCINATE 125 MG/2ML
125 INJECTION, POWDER, LYOPHILIZED, FOR SOLUTION INTRAMUSCULAR; INTRAVENOUS PRN
OUTPATIENT
Start: 2023-07-26

## 2023-06-22 RX ORDER — 0.9 % SODIUM CHLORIDE 0.9 %
3 VIAL (ML) INJECTION PRN
OUTPATIENT
Start: 2023-07-26

## 2023-06-22 RX ORDER — DIPHENHYDRAMINE HYDROCHLORIDE 50 MG/ML
50 INJECTION INTRAMUSCULAR; INTRAVENOUS PRN
OUTPATIENT
Start: 2023-07-26

## 2023-06-22 RX ORDER — 0.9 % SODIUM CHLORIDE 0.9 %
VIAL (ML) INJECTION PRN
OUTPATIENT
Start: 2023-07-26

## 2023-06-22 RX ORDER — ACETAMINOPHEN 325 MG/1
650 TABLET ORAL ONCE
OUTPATIENT
Start: 2023-07-26

## 2023-06-22 RX ORDER — 0.9 % SODIUM CHLORIDE 0.9 %
10 VIAL (ML) INJECTION PRN
OUTPATIENT
Start: 2023-07-26

## 2023-06-22 RX ORDER — EPINEPHRINE 1 MG/ML(1)
0.5 AMPUL (ML) INJECTION PRN
OUTPATIENT
Start: 2023-07-26

## 2023-06-22 RX ORDER — SODIUM CHLORIDE 9 MG/ML
INJECTION, SOLUTION INTRAVENOUS CONTINUOUS
OUTPATIENT
Start: 2023-07-26

## 2023-06-22 ASSESSMENT — PATIENT HEALTH QUESTIONNAIRE - PHQ9: CLINICAL INTERPRETATION OF PHQ2 SCORE: 0

## 2023-06-22 ASSESSMENT — FIBROSIS 4 INDEX: FIB4 SCORE: 1.56

## 2023-06-22 NOTE — PROGRESS NOTES
[facility] RHEUMATOLOGY  [address], [address], [place] [address]  Phone: [phone] ? Fax: [phone]    RHEUMATOLOGY FOLLOW-UP VISIT NOTE      DATE OF SERVICE: 06/22/2023         Subjective     PRIMARY CARE PRACTITIONER:  Cindy Kearney M.D.  [id number]  [place] [address]    PATIENT IDENTIFICATION:  Ania Quevedo  [address]  [place] [address]    YOB: 1945    MEDICAL RECORD NUMBER: [id number]          CHIEF COMPLAINT:   Chief Complaint   Patient presents with    Follow-Up     Rheumatoid arthritis involving multiple joints (HCC)       RHEUMATOLOGIC HISTORY:  Ania Quevedo is a 77 y.o. female with pertinent history notable for seropositive rheumatoid arthritis since 1978, osteoarthritis of multiple joints (hands and feet), and osteopenia of femur/forearm (based on DEXA scan in 1/2021), and questionable fragility fracture of spine among other comorbidities. Previously under the care of a local rheumatologist who has retired (Dr. Dano Torres), she initially presented on 8/30/22 to establish care for continued evaluation and management of her condition. Reported mild intermittent joint/muscle pain in her wrists, mid/lower back, ankles and feet, short-lived morning stiffness that improved with activity but tended to worsen with much activity. Additionally reported body aches, fatigue with weakness, and intermittent numbness of distal legs/feet, but otherwise doing quite well. [place] that her regimen of methotrexate and Remicade infusion had been very helpful at controlling her RA symptoms.     Pertinent treatment history: Prednisone taper (helpful), hydroxychloroquine (initially helpful but became ineffective), methotrexate 15 mg oral weekly with folic acid 1 mg daily (1999-present, effective), Remicade 600 mg IV infusion every 8 weeks (1999-present, effective).    Pertinent laboratory results: Positive anti-CCP 69 with negative RF (in 8/2022); negative/normal HBV and HCV  (in 9/2021), HLA-B27, ESR, CRP, CBC and CMP (in 8/2022).    Pertinent XR imaging (in 8/2022): Hands with mild multifocal osteoarthritis and right first MCP erosion versus subchondral cyst. Feet with mild multifocal osteoarthritis but no evidence of inflammatory arthropathy.    INTERVAL HISTORY:  Minimal intermittent pain in left thumb base, wrists, feet, knees, and lower back.  Tends to feel more achy towards the end of her Remicade infusion cycle, but generally doing quite well.  Spends her summers in [place] and schultz in [place] where she also follows with a rheumatologist.  Had a recent routine blood test done in [place] which were reportedly unremarkable.    REVIEW OF SYSTEMS:  Except as noted in the history above, relevant review of systems with emphasis on autoimmune rheumatic diseases was otherwise negative.      ACTIVE PROBLEM LIST:  Patient Active Problem List    Diagnosis Date Noted    Primary osteoarthritis involving multiple joints 06/22/2023    Immunosuppressed status (HCC) 08/30/2022    Hypokalemia 09/04/2021    COVID-19 virus infection 09/04/2021    Seropositive rheumatoid arthritis (HCC) 09/04/2021    Essential hypertension 09/04/2021    Dehydration 09/04/2021       PAST MEDICAL HISTORY:  Past Medical History:   Diagnosis Date    Back pain     Hypertension     Rheumatoid arthritis (HCC)        PAST SURGICAL HISTORY:  Past Surgical History:   Procedure Laterality Date    DENTAL EXTRACTION(S)         MEDICATIONS:  Current Outpatient Medications   Medication Sig    folic acid (FOLVITE) 1 MG Tab Take 1 mg by mouth every day.    guaiFENesin ER (MUCINEX) 600 MG TABLET SR 12 HR Take 600 mg by mouth every 12 hours.    Cetirizine HCl 10 MG Cap Take  by mouth.    aspirin (ASA) 81 MG Chew Tab chewable tablet Chew 1 Tablet every day.    methotrexate 2.5 MG Tab Take 2.5 mg by mouth every 7 days. Patient takes on Wednesday    inFLIXimab (REMICADE IV) Infuse 1 Dose into a venous catheter every 8 weeks.     "hydroCHLOROthiazide (HYDRODIURIL) 25 MG Tab Take 25 mg by mouth every Monday, Wednesday, and Friday.    Ascorbic Acid (VITAMIN C PO) Take 1 Tablet by mouth every day.    Cyanocobalamin (VITAMIN B-12 PO) Take 1 Tablet by mouth every day.    VITAMIN D PO Take 1 Tablet by mouth every day.    MAGNESIUM PO Take 1 Tablet by mouth every day.    ibuprofen (MOTRIN) 200 MG Tab Take 600 mg by mouth every 8 hours as needed for Mild Pain.    albuterol 108 (90 Base) MCG/ACT Aero Soln inhalation aerosol Inhale 1 Puff one time.       ALLERGIES:   Allergies   Allergen Reactions    Bacitracin     Dust Mite Mixed Allergen Ext [Mite (D. Farinae)] Unspecified     Cough, SOB       IMMUNIZATIONS:  Immunization History   Administered Date(s) Administered    Hep A/HEP B Combined Vaccine (TwinRix) 09/16/2004, 11/19/2004, 10/18/2006    IPV 10/16/1991    Influenza Seasonal Injectable - Historical Data 10/20/2014    Influenza Vaccine Quad Inj (Pf) 09/10/2020    Pneumococcal polysaccharide vaccine (PPSV-23) 11/19/2004    TD Vaccine 09/16/2004    Typhoid Vaccine 09/16/2004, 10/18/2006       SOCIAL HISTORY:   Social History     Socioeconomic History    Marital status:    Tobacco Use    Smoking status: Former     Types: Cigarettes    Smokeless tobacco: Never   Substance and Sexual Activity    Alcohol use: Not Currently    Drug use: Never       FAMILY HISTORY:  Family History   Problem Relation Age of Onset    Heart Disease Mother             Objective     Vital Signs: /72 (BP Location: Left arm, Patient Position: Sitting, BP Cuff Size: Adult)   Pulse 72   Temp 36.2 °C (97.1 °F) (Temporal)   Resp 18   Ht 1.575 m (5' 2\")   Wt 91.2 kg (201 lb)   SpO2 98% Body mass index is 36.76 kg/m².    General: Appears well and comfortable  Eyes: No scleral or conjunctival lesions  ENT: No apparent oral or nasal lesions  Head/Neck: No apparent scalp or neck lesions  Cardiovascular: Regular rate and rhythm  Respiratory: Breathing quiet and " unlabored  Gastrointestinal: No apparent organomegaly or abdominal masses  Integumentary: No significant cutaneous lesions or discolorations  Musculoskeletal: No significant joint tenderness, periarticular soft tissue swelling, warmth, erythema, or overt synovitis; no significant restriction in range of motion of joints examined  Neurologic: No focal sensory or motor deficits  Psychiatric: Mood and affect appropriate      LABORATORY RESULTS REVIEWED AND INTERPRETED BY ME:  Lab Results   Component Value Date/Time    SEDRATEWES 8 08/30/2022 03:24 PM    CREACTPROT <0.30 08/30/2022 03:24 PM     Lab Results   Component Value Date/Time    RHEUMFACTN 10 08/30/2022 03:24 PM    CCPANTIBODY 69 (H) 08/30/2022 03:24 PM    [id number] Negative 08/30/2022 03:23 PM     Lab Results   Component Value Date/Time    TSHULTRASEN 2.670 09/05/2021 12:49 AM     Lab Results   Component Value Date/Time    WBC 7.6 08/30/2022 03:24 PM    RBC 4.71 08/30/2022 03:24 PM    HEMOGLOBIN 14.8 08/30/2022 03:24 PM    HEMATOCRIT 43.5 08/30/2022 03:24 PM    MCV 92.4 08/30/2022 03:24 PM    MCH 31.4 08/30/2022 03:24 PM    MCHC 34.0 08/30/2022 03:24 PM    RDW 50.2 (H) 08/30/2022 03:24 PM    PLATELETCT 266 08/30/2022 03:24 PM    MPV 9.8 08/30/2022 03:24 PM    NEUTS 4.16 08/30/2022 03:24 PM    LYMPHOCYTES 33.20 08/30/2022 03:24 PM    MONOCYTES 8.30 08/30/2022 03:24 PM    EOSINOPHILS 2.80 08/30/2022 03:24 PM    BASOPHILS 0.50 08/30/2022 03:24 PM     Lab Results   Component Value Date/Time    ASTSGOT 21 08/30/2022 03:24 PM    ALTSGPT 16 08/30/2022 03:24 PM    ALKPHOSPHAT 69 08/30/2022 03:24 PM    TBILIRUBIN 0.6 08/30/2022 03:24 PM    TOTPROTEIN 7.5 08/30/2022 03:24 PM    ALBUMIN 4.0 08/30/2022 03:24 PM     Lab Results   Component Value Date/Time    SODIUM 136 08/30/2022 03:24 PM    POTASSIUM 3.6 08/30/2022 03:24 PM    CHLORIDE 99 08/30/2022 03:24 PM    CO2 26 08/30/2022 03:24 PM    GLUCOSE 95 08/30/2022 03:24 PM    BUN 14 08/30/2022 03:24 PM    CREATININE 0.75  08/30/2022 03:24 PM    CALCIUM 9.9 08/30/2022 03:24 PM    MAGNESIUM 1.9 09/05/2021 12:49 AM     Lab Results   Component Value Date/Time    HEPBSAG Non-Reactive 09/05/2021 12:49 AM    HEPBCORIGM Non-Reactive 09/05/2021 12:49 AM    HEPCAB Non-Reactive 09/05/2021 12:49 AM       RADIOLOGY RESULTS REVIEWED AND INTERPRETED BY ME:  Results for orders placed during the hospital encounter of 08/30/22    DX-JOINT SURVEY-FEET SINGLE VIEW    Impression  No convincing evidence of inflammatory arthropathy    Mild osteoarthritis    Results for orders placed during the hospital encounter of 08/30/22    DX-JOINT SURVEY-HANDS SINGLE VIEW    Impression  Single right first MCP erosion versus subchondral cyst could support a diagnosis of mild rheumatoid arthritis    Mild osteoarthritis      All relevant laboratory and imaging results reported on this note were reviewed and interpreted by me.         Assessment & Plan     Ania Quevedo is a 77 y.o. female with history as noted above whose presentation merits the following diagnostic and clinical status impressions and recommendations:    1. Seropositive rheumatoid arthritis (HCC)  Clinically and serologically quiescent in remission on her current long-term regimen of methotrexate and infliximab infusion, so no need for modification of treatment. However, given the potential for discordance between immunologic activity and clinical disease manifestations, reasonable to routinely reassess markers of disease activity to gauge overall trajectory in response to treatment.  - Sed Rate; Future  - CRP QUANTITIVE (NON-CARDIAC); Future  - Continue infliximab 600 mg IV infusion every 8 weeks (infusion renewed to start 7/26/23)    2. Primary osteoarthritis involving multiple joints  Presumably an important etiology of her overall joint pain burden which can be managed supportively.  - Voltaren 1% gel and Tylenol Arthritis with or without oral NSAIDs as needed  - Consider intra-articular steroid  injection if that becomes necessary    3. Immunosuppressed status (HCC)  Presently with no reported history, physical exam, or laboratory evidence to suggest significant adverse drug effects or opportunistic infections, but need routine monitoring per guidelines.  - CBC WITH DIFFERENTIAL; Future  - Comp Metabolic Panel; Future  - Need to ascertain age-appropriate vaccines      The above assessment and plan were discussed with the patient who acknowledged understanding of the plan.    FOLLOW-UP: Return in about 9 months (around 3/22/2024) for Short.         Thank you for the opportunity to participate in the care of Ania Quevedo.    Bran Frias M.D., M.S.  Rheumatologist, [facility] ? [facility]   of Clinical Medicine, Department of Internal Medicine  [facility] ? [facility]

## 2023-06-22 NOTE — PATIENT INSTRUCTIONS
AFTER VISIT INSTRUCTIONS    Below are important information to help you navigate your healthcare needs and help us serve you safely and effectively:  If laboratory tests and/or imaging studies were ordered, remember to go get them done as instructed.  If new prescriptions and/or refills were sent, remember to go pick them up from your local pharmacy, or call the specialty pharmacy to request shipment.  Always take your prescription medications exactly as prescribed unless instructed otherwise.  Note that antirheumatic drugs and steroids are immunosuppressive which means they increase your risk of infections and have multiple potential adverse effects on various organ systems in your body, though most of them are uncommon.  It is important that you are up-to-date on age-appropriate immunizations, particularly shingles and bacterial/viral pneumonia vaccines, which you can request from me or your primary care provider.  Be sure to read the drug package inserts to learn about the potential side effects of your medications before you start taking them.  If you experience any significant drug side effects, stop taking the medication and notify me promptly, and depending on the severity of the side effects, consider going to an urgent care or emergency department for immediate attention.  If there are significant findings on your lab tests and imaging studies that warrant further action, I will notify you with explanations via [employer] or phone call, otherwise you can view them on after[employer]OT and let me know if you have any questions.  Note that [employer] messages are typically read during office hours and may take 1-7 business days before a response depending on the urgency of the situation and how busy my clinic schedule is.  In general, after[employer]OT messaging is for non-urgent matters that do not require immediate attention, so for urgent matters that cannot wait, you are advised to go to an urgent care.  Lastly, you are granted  MyChart access to my documentation of your visit and are encouraged to read my note which details my assessment and plan for your condition.

## 2023-08-14 ENCOUNTER — TELEPHONE (OUTPATIENT)
Dept: RHEUMATOLOGY | Facility: MEDICAL CENTER | Age: 78
End: 2023-08-14

## 2023-08-14 NOTE — TELEPHONE ENCOUNTER
VOICEMAIL  1. Caller Name: Fani                      Call Back Number: [phone]    2. Message: Patient requesting letter to receive handicapped placard, requesting our office to write    3. Patient approves office to leave a detailed voicemail/MyChart message: yes

## 2023-09-05 NOTE — TELEPHONE ENCOUNTER
VOICEMAIL  1. Caller Name: Fani                      Call Back Number: [phone]     2. Message: Patient requesting letter to receive handicapped placard, requesting our office to write     3. Patient approves office to leave a detailed voicemail/MyChart message: yes

## 2023-09-20 ENCOUNTER — APPOINTMENT (OUTPATIENT)
Dept: ONCOLOGY | Facility: MEDICAL CENTER | Age: 78
End: 2023-09-20
Payer: MEDICARE

## 2023-11-29 ENCOUNTER — PATIENT MESSAGE (OUTPATIENT)
Dept: HEALTH INFORMATION MANAGEMENT | Facility: OTHER | Age: 78
End: 2023-11-29

## 2024-05-22 ENCOUNTER — OFFICE VISIT (OUTPATIENT)
Dept: RHEUMATOLOGY | Facility: MEDICAL CENTER | Age: 79
End: 2024-05-22
Attending: STUDENT IN AN ORGANIZED HEALTH CARE EDUCATION/TRAINING PROGRAM
Payer: MEDICARE

## 2024-05-22 VITALS
HEART RATE: 66 BPM | OXYGEN SATURATION: 95 % | TEMPERATURE: 97.7 F | BODY MASS INDEX: 31.47 KG/M2 | HEIGHT: 62 IN | SYSTOLIC BLOOD PRESSURE: 142 MMHG | DIASTOLIC BLOOD PRESSURE: 68 MMHG | WEIGHT: 171 LBS

## 2024-05-22 DIAGNOSIS — D84.9 IMMUNOSUPPRESSED STATUS (HCC): ICD-10-CM

## 2024-05-22 DIAGNOSIS — M15.9 OSTEOARTHRITIS INVOLVING MULTIPLE JOINTS ON BOTH SIDES OF BODY: ICD-10-CM

## 2024-05-22 DIAGNOSIS — M05.9 SEROPOSITIVE RHEUMATOID ARTHRITIS (HCC): ICD-10-CM

## 2024-05-22 PROCEDURE — 3077F SYST BP >= 140 MM HG: CPT | Performed by: STUDENT IN AN ORGANIZED HEALTH CARE EDUCATION/TRAINING PROGRAM

## 2024-05-22 PROCEDURE — 3078F DIAST BP <80 MM HG: CPT | Performed by: STUDENT IN AN ORGANIZED HEALTH CARE EDUCATION/TRAINING PROGRAM

## 2024-05-22 PROCEDURE — 99214 OFFICE O/P EST MOD 30 MIN: CPT | Performed by: STUDENT IN AN ORGANIZED HEALTH CARE EDUCATION/TRAINING PROGRAM

## 2024-05-22 ASSESSMENT — PATIENT HEALTH QUESTIONNAIRE - PHQ9: CLINICAL INTERPRETATION OF PHQ2 SCORE: 0

## 2024-05-22 ASSESSMENT — FIBROSIS 4 INDEX: FIB4 SCORE: 1.578947368421052632

## 2024-05-22 NOTE — PATIENT INSTRUCTIONS
AFTER VISIT INSTRUCTIONS    Below are important information to help you navigate your healthcare needs and help us serve you safely and effectively:  If laboratory tests and/or imaging studies were ordered, remember to go get them done as instructed.  If new prescriptions and/or refills were sent, remember to go pick them up from your local pharmacy, or call the specialty pharmacy to request shipment.  Always take your prescription medications exactly as prescribed unless instructed otherwise.  Note that antirheumatic drugs and steroids are immunosuppressive which means they increase your risk of infections and have multiple potential adverse effects on various organ systems in your body, though many of them are uncommon.  It is important that you are up-to-date on age-appropriate immunizations, particularly shingles and bacterial/viral pneumonia vaccines, which you can request from me or your primary care provider.  Be sure to read the drug package inserts to learn about the potential side effects of your medications before you start taking them.  If you experience any significant drug side effects, stop taking the medication and notify me promptly, and depending on the severity of the side effects, consider going to an urgent care or emergency department for immediate attention.  If there are significant findings on your lab tests and imaging studies that warrant further action, I will notify you with explanations via [employer] or phone call, otherwise you can view them on [employer] and let me know if you have any questions.  Note that [employer] messages are typically read during office hours and may take 1-7 business days before a response depending on the urgency of the situation and how busy my clinic schedule is.  In general, [employer] messaging is for non-urgent matters that do not require immediate attention, so for urgent matters that cannot wait, you are advised to go to an urgent care.  You are granted [employer]  access to my documentation of your visit and are encouraged to read my note which details my assessment and plan for your condition.  To learn more about your condition and rheumatic diseases evaluated and treated by rheumatologists, as well as gain access to many helpful resources about these diseases, visit our website: [email or web address].  To properly dispose of your unused, unwanted, or residual medications/supplies, visit the Drug Enforcement Administration website to locate your closest drop-off location: www.titi.gov/everyday-takeback-day.

## 2024-05-22 NOTE — PROGRESS NOTES
[facility] RHEUMATOLOGY  [address], [address], [place] [address]  Phone: [phone] ? Fax: [phone]  [facility]/Health-Services/Rheumatology    FOLLOW-UP VISIT NOTE      DATE OF SERVICE: 05/22/2024         Subjective     PRIMARY CARE PRACTITIONER:  Cindy Kearney M.D.  [address]  [place] [address]    PATIENT IDENTIFICATION:  Ania Quevedo  [address]  [place] [address]    YOB: 1945    MEDICAL RECORD NUMBER: [id number]          CHIEF COMPLAINT:   Chief Complaint   Patient presents with    Follow-Up     Seropositive rheumatoid arthritis (HCC)       RHEUMATOLOGIC HISTORY:  Ania Quevedo is a 78 y.o. female with pertinent history notable for seropositive rheumatoid arthritis since 1978, osteoarthritis of multiple joints (hands and feet), and osteopenia of femur/forearm (based on DEXA scan in 1/2021), and questionable fragility fracture of spine among other comorbidities. Previously under the care of a local rheumatologist who has retired (Dr. Dano Torres), she initially presented on 8/30/22 to establish care for continued evaluation and management of her condition. Reported mild intermittent joint/muscle pain in her wrists, mid/lower back, ankles and feet, short-lived morning stiffness that improved with activity but tended to worsen with much activity. Additionally reported body aches, fatigue with weakness, and intermittent numbness of distal legs/feet, but otherwise doing quite well. [place] that her regimen of methotrexate and Remicade infusion had been very helpful at controlling her RA symptoms.     Pertinent treatments: Prednisone taper (helpful), hydroxychloroquine (initially helpful but became ineffective), methotrexate 15 mg oral weekly with folic acid 1 mg daily (1999-present, effective), Remicade 600 mg IV infusion every 8 weeks (1999-present, effective).     Pertinent positive labs: Positive anti-CCP 69 with negative RF (in 8/2022).    Pertinent negative  labs: Negative HBV and HCV (in 9/2021), HLA-B27, CRP, ESR, CBC and CMP (in 8/2022).     Pertinent XR imaging: Hands (in 8/2022) with mild multifocal osteoarthritis and right first MCP erosion versus subchondral cyst. Feet (in 8/2022) with mild multifocal osteoarthritis but no evidence of inflammatory arthropathy.      INTERVAL HISTORY:  Reports interval history as noted on the questionnaire below or scanned under media tab.  Notably minimal intermittent pain in wrists and lower back that worsen with activity, and intermittent tingling/numbness on the toes.  Doing quite well otherwise and feels that her current regimen of Remicade infusion remains very helpful.  Still spends her bird in [place] and schultz in [facility] where she also follows with a rheumatologist.     REVIEW OF SYSTEMS:  Except as noted in the history above, relevant review of systems with emphasis on autoimmune rheumatic diseases was otherwise negative.      ACTIVE PROBLEM LIST:  Patient Active Problem List    Diagnosis Date Noted    Osteoarthritis involving multiple joints on both sides of body 06/22/2023    Immunosuppressed status (HCC) 08/30/2022    Hypokalemia 09/04/2021    COVID-19 virus infection 09/04/2021    Seropositive rheumatoid arthritis (HCC) 09/04/2021    Essential hypertension 09/04/2021    Dehydration 09/04/2021       PAST MEDICAL HISTORY:  Past Medical History:   Diagnosis Date    Back pain     Hypertension     Rheumatoid arthritis (HCC)        PAST SURGICAL HISTORY:  Past Surgical History:   Procedure Laterality Date    DENTAL EXTRACTION(S)         SOCIAL HISTORY:  Social History     Socioeconomic History    Marital status:      Spouse name: Not on file    Number of children: Not on file    Years of education: Not on file    Highest education level: Not on file   Occupational History    Not on file   Tobacco Use    Smoking status: Former     Types: Cigarettes    Smokeless tobacco: Never   Substance and Sexual Activity     Alcohol use: Not Currently    Drug use: Never    Sexual activity: Not on file   Other Topics Concern    Not on file   Social History Narrative    Not on file     Social Determinants of Health     Financial Resource Strain: Not on file   Food Insecurity: Not on file   Transportation Needs: Not on file   Physical Activity: Not on file   Stress: Not on file   Social Connections: Not on file   Intimate Partner Violence: Not on file   Housing Stability: Not on file       FAMILY HISTORY:  Family History   Problem Relation Age of Onset    Heart Disease Mother        MEDICATIONS:  Current Outpatient Medications   Medication Sig    folic acid (FOLVITE) 1 MG Tab Take 1 mg by mouth every day.    guaiFENesin ER (MUCINEX) 600 MG TABLET SR 12 HR Take 600 mg by mouth every 12 hours.    Cetirizine HCl 10 MG Cap Take  by mouth.    aspirin (ASA) 81 MG Chew Tab chewable tablet Chew 1 Tablet every day.    methotrexate 2.5 MG Tab Take 2.5 mg by mouth every 7 days. Patient takes on Wednesday    inFLIXimab (REMICADE IV) Infuse 1 Dose into a venous catheter every 8 weeks.    hydroCHLOROthiazide (HYDRODIURIL) 25 MG Tab Take 25 mg by mouth every Monday, Wednesday, and Friday.    Ascorbic Acid (VITAMIN C PO) Take 1 Tablet by mouth every day.    Cyanocobalamin (VITAMIN B-12 PO) Take 1 Tablet by mouth every day.    VITAMIN D PO Take 1 Tablet by mouth every day.    MAGNESIUM PO Take 1 Tablet by mouth every day.    ibuprofen (MOTRIN) 200 MG Tab Take 600 mg by mouth every 8 hours as needed for Mild Pain.    albuterol 108 (90 Base) MCG/ACT Aero Soln inhalation aerosol Inhale 1 Puff one time.       ALLERGIES:   Allergies   Allergen Reactions    Bacitracin     Dust Mite Mixed Allergen Ext [Mite (D. Farinae)] Unspecified     Cough, SOB       IMMUNIZATIONS:  Immunization History   Administered Date(s) Administered    Hep A/HEP B Combined Vaccine (TwinRix) 09/16/2004, 11/19/2004, 10/18/2006    IPV 10/16/1991    Influenza Seasonal Injectable - Historical  "Data 10/20/2014    Influenza Vaccine Quad Inj (Pf) 09/10/2020    Pneumococcal polysaccharide vaccine (PPSV-23) 11/19/2004    TD Vaccine 09/16/2004    Typhoid Vaccine 09/16/2004, 10/18/2006            Objective     Vital Signs: BP (!) 142/68 (BP Location: Left arm, Patient Position: Sitting, BP Cuff Size: Adult)   Pulse 66   Temp 36.5 °C (97.7 °F) (Temporal)   Ht 1.575 m (5' 2\")   Wt 77.6 kg (171 lb)   SpO2 95% Body mass index is 31.28 kg/m².    General: Appears well and comfortable  Eyes: No scleral or conjunctival lesions  ENT: No apparent oral, nasal, or ear lesions  Head/Neck: No apparent scalp or neck lesions  Cardiovascular: Regular rate and rhythm  Respiratory: Breathing quiet and unlabored  Gastrointestinal: No apparent organomegaly or abdominal masses  Integumentary: No significant cutaneous lesions or dyspigmentation  Musculoskeletal: No significant joint tenderness, periarticular soft tissue swelling, warmth, erythema, or overt synovitis; no significant restriction in range of motion of joints examined  Neurologic: No focal sensory or motor deficits  Psychiatric: Mood and affect appropriate      LABORATORY RESULTS REVIEWED AND INTERPRETED BY ME:  Lab Results   Component Value Date/Time    CREACTPROT <0.30 08/30/2022 03:24 PM    SEDRATEWES 8 08/30/2022 03:24 PM     Lab Results   Component Value Date/Time    RHEUMFACTN 10 08/30/2022 03:24 PM    CCPANTIBODY 69 (H) 08/30/2022 03:24 PM    JDKQ34SDMB Negative 08/30/2022 03:23 PM     Lab Results   Component Value Date/Time    TSHULTRASEN 2.670 09/05/2021 12:49 AM     Lab Results   Component Value Date/Time    WBC 7.6 08/30/2022 03:24 PM    RBC 4.71 08/30/2022 03:24 PM    HEMOGLOBIN 14.8 08/30/2022 03:24 PM    HEMATOCRIT 43.5 08/30/2022 03:24 PM    MCV 92.4 08/30/2022 03:24 PM    MCH 31.4 08/30/2022 03:24 PM    MCHC 34.0 08/30/2022 03:24 PM    RDW 50.2 (H) 08/30/2022 03:24 PM    PLATELETCT 266 08/30/2022 03:24 PM    MPV 9.8 08/30/2022 03:24 PM    NEUTS 4.16 " 08/30/2022 03:24 PM    LYMPHOCYTES 33.20 08/30/2022 03:24 PM    MONOCYTES 8.30 08/30/2022 03:24 PM    EOSINOPHILS 2.80 08/30/2022 03:24 PM    BASOPHILS 0.50 08/30/2022 03:24 PM     Lab Results   Component Value Date/Time    ASTSGOT 21 08/30/2022 03:24 PM    ALTSGPT 16 08/30/2022 03:24 PM    ALKPHOSPHAT 69 08/30/2022 03:24 PM    TBILIRUBIN 0.6 08/30/2022 03:24 PM    TOTPROTEIN 7.5 08/30/2022 03:24 PM    ALBUMIN 4.0 08/30/2022 03:24 PM     Lab Results   Component Value Date/Time    SODIUM 136 08/30/2022 03:24 PM    POTASSIUM 3.6 08/30/2022 03:24 PM    CHLORIDE 99 08/30/2022 03:24 PM    CO2 26 08/30/2022 03:24 PM    GLUCOSE 95 08/30/2022 03:24 PM    BUN 14 08/30/2022 03:24 PM    CREATININE 0.75 08/30/2022 03:24 PM    CALCIUM 9.9 08/30/2022 03:24 PM    MAGNESIUM 1.9 09/05/2021 12:49 AM     Lab Results   Component Value Date/Time    HEPBSAG Non-Reactive 09/05/2021 12:49 AM    HEPBCORIGM Non-Reactive 09/05/2021 12:49 AM    HEPCAB Non-Reactive 09/05/2021 12:49 AM       RADIOLOGY RESULTS REVIEWED AND INTERPRETED BY ME:  Results for orders placed during the hospital encounter of 08/30/22    DX-JOINT SURVEY-FEET SINGLE VIEW    Impression  No convincing evidence of inflammatory arthropathy    Mild osteoarthritis    Results for orders placed during the hospital encounter of 08/30/22    DX-JOINT SURVEY-HANDS SINGLE VIEW    Impression  Single right first MCP erosion versus subchondral cyst could support a diagnosis of mild rheumatoid arthritis    Mild osteoarthritis      All relevant laboratory and imaging results reported on this note were reviewed and interpreted by me.         Assessment & Plan     Ania Quevedo is a 78 y.o. female with history and physical as noted above whose presentation merits the following clinical impressions and recommendations:    1. Seropositive rheumatoid arthritis (HCC)  Clinically and serologically remains quiescent in remission on the current long-term regimen of methotrexate and infliximab  infusion, so no need for modification of treatment. However, given the potential for discordance between immunologic activity and clinical disease manifestations, need to occasionally reassess markers of disease activity to gauge overall trajectory in response to treatment.  - CRP and ESR (previously ordered)  - Continue methotrexate 15 mg oral weekly with folic acid 1 mg daily except the day of methotrexate  - Continue infliximab 600 mg IV infusion every 8 weeks (infusion order to be renewed and sent to [facility] as needed)  - Need to obtain results of recent labs (reportedly had them done in [place] and were unremarkable)  - Routine follow-up with her local rheumatologist in [facility] when over there during the winter season    2. Osteoarthritis involving multiple joints on both sides of body  Presumably an important etiology of her overall joint pain burden which can be managed supportively.  - Voltaren 1% gel and Tylenol Arthritis with or without oral NSAIDs as needed  - Consider intra-articular steroid injection if that becomes necessary     3. Immunosuppressed status (HCC)  Presently with no history, physical, or laboratory evidence to suggest significant adverse drug effects or opportunistic infections, but need routine monitoring per guidelines.  - CBC and CMP (previously ordered)  - Need to ascertain age-appropriate vaccines in addition to the ones listed above under immunizations  - Need to obtain results of recent labs (reportedly had them done in [place] and were unremarkable)      The above assessment and plan were discussed with the patient who acknowledged understanding of the plan.    FOLLOW-UP: Return in about 13 months (around 6/22/2025) for Short.         Thank you for the opportunity to participate in the care of Ania Quevedo.    Bran Frias MD, MS, FACR  Rheumatologist, [facility], [facility]   of Clinical Medicine, Department of  Internal Medicine  [facility] ? [facility] [employer]

## 2024-06-20 ENCOUNTER — HOSPITAL ENCOUNTER (OUTPATIENT)
Dept: LAB | Facility: MEDICAL CENTER | Age: 79
End: 2024-06-20
Attending: STUDENT IN AN ORGANIZED HEALTH CARE EDUCATION/TRAINING PROGRAM
Payer: MEDICARE

## 2024-06-20 DIAGNOSIS — M05.9 SEROPOSITIVE RHEUMATOID ARTHRITIS (HCC): ICD-10-CM

## 2024-06-20 DIAGNOSIS — D84.9 IMMUNOSUPPRESSED STATUS (HCC): ICD-10-CM

## 2024-06-20 LAB
ALBUMIN SERPL BCP-MCNC: 4.1 G/DL (ref 3.2–4.9)
ALBUMIN/GLOB SERPL: 1.1 G/DL
ALP SERPL-CCNC: 62 U/L (ref 30–99)
ALT SERPL-CCNC: 17 U/L (ref 2–50)
ANION GAP SERPL CALC-SCNC: 14 MMOL/L (ref 7–16)
AST SERPL-CCNC: 27 U/L (ref 12–45)
BASOPHILS # BLD AUTO: 0.7 % (ref 0–1.8)
BASOPHILS # BLD: 0.07 K/UL (ref 0–0.12)
BILIRUB SERPL-MCNC: 0.6 MG/DL (ref 0.1–1.5)
BUN SERPL-MCNC: 18 MG/DL (ref 8–22)
CALCIUM ALBUM COR SERPL-MCNC: 9.9 MG/DL (ref 8.5–10.5)
CALCIUM SERPL-MCNC: 10 MG/DL (ref 8.5–10.5)
CHLORIDE SERPL-SCNC: 98 MMOL/L (ref 96–112)
CO2 SERPL-SCNC: 27 MMOL/L (ref 20–33)
CREAT SERPL-MCNC: 0.74 MG/DL (ref 0.5–1.4)
CRP SERPL HS-MCNC: 0.35 MG/DL (ref 0–0.75)
EOSINOPHIL # BLD AUTO: 0.25 K/UL (ref 0–0.51)
EOSINOPHIL NFR BLD: 2.7 % (ref 0–6.9)
ERYTHROCYTE [DISTWIDTH] IN BLOOD BY AUTOMATED COUNT: 50.8 FL (ref 35.9–50)
ERYTHROCYTE [SEDIMENTATION RATE] IN BLOOD BY WESTERGREN METHOD: 8 MM/HOUR (ref 0–25)
GFR SERPLBLD CREATININE-BSD FMLA CKD-EPI: 81 ML/MIN/1.73 M 2
GLOBULIN SER CALC-MCNC: 3.7 G/DL (ref 1.9–3.5)
GLUCOSE SERPL-MCNC: 86 MG/DL (ref 65–99)
HCT VFR BLD AUTO: 45.6 % (ref 37–47)
HGB BLD-MCNC: 15.1 G/DL (ref 12–16)
IMM GRANULOCYTES # BLD AUTO: 0.02 K/UL (ref 0–0.11)
IMM GRANULOCYTES NFR BLD AUTO: 0.2 % (ref 0–0.9)
LYMPHOCYTES # BLD AUTO: 2.55 K/UL (ref 1–4.8)
LYMPHOCYTES NFR BLD: 27.1 % (ref 22–41)
MCH RBC QN AUTO: 32 PG (ref 27–33)
MCHC RBC AUTO-ENTMCNC: 33.1 G/DL (ref 32.2–35.5)
MCV RBC AUTO: 96.6 FL (ref 81.4–97.8)
MONOCYTES # BLD AUTO: 0.66 K/UL (ref 0–0.85)
MONOCYTES NFR BLD AUTO: 7 % (ref 0–13.4)
NEUTROPHILS # BLD AUTO: 5.86 K/UL (ref 1.82–7.42)
NEUTROPHILS NFR BLD: 62.3 % (ref 44–72)
NRBC # BLD AUTO: 0 K/UL
NRBC BLD-RTO: 0 /100 WBC (ref 0–0.2)
PLATELET # BLD AUTO: 342 K/UL (ref 164–446)
PMV BLD AUTO: 11.1 FL (ref 9–12.9)
POTASSIUM SERPL-SCNC: 4 MMOL/L (ref 3.6–5.5)
PROT SERPL-MCNC: 7.8 G/DL (ref 6–8.2)
RBC # BLD AUTO: 4.72 M/UL (ref 4.2–5.4)
SODIUM SERPL-SCNC: 139 MMOL/L (ref 135–145)
WBC # BLD AUTO: 9.4 K/UL (ref 4.8–10.8)

## 2024-06-20 PROCEDURE — 85025 COMPLETE CBC W/AUTO DIFF WBC: CPT

## 2024-06-20 PROCEDURE — 36415 COLL VENOUS BLD VENIPUNCTURE: CPT

## 2024-06-20 PROCEDURE — 80053 COMPREHEN METABOLIC PANEL: CPT

## 2024-06-20 PROCEDURE — 85652 RBC SED RATE AUTOMATED: CPT

## 2024-06-20 PROCEDURE — 86140 C-REACTIVE PROTEIN: CPT

## 2025-03-24 DIAGNOSIS — D84.9 IMMUNOSUPPRESSED STATUS (HCC): ICD-10-CM

## 2025-03-24 DIAGNOSIS — M05.9 SEROPOSITIVE RHEUMATOID ARTHRITIS (HCC): ICD-10-CM

## 2025-05-21 NOTE — TELEPHONE ENCOUNTER
Patient would like a 3 month refill of methotrexate since she is almost out and is not scheduled to see you until September. Please let me know when this is ordered so I can call and inform patient. Thank you

## 2025-05-24 RX ORDER — METHOTREXATE 2.5 MG/1
2.5 TABLET ORAL
OUTPATIENT
Start: 2025-05-24

## 2025-05-27 ENCOUNTER — OFFICE VISIT (OUTPATIENT)
Dept: RHEUMATOLOGY | Facility: MEDICAL CENTER | Age: 80
End: 2025-05-27
Attending: STUDENT IN AN ORGANIZED HEALTH CARE EDUCATION/TRAINING PROGRAM
Payer: MEDICARE

## 2025-05-27 ENCOUNTER — HOSPITAL ENCOUNTER (OUTPATIENT)
Dept: LAB | Facility: MEDICAL CENTER | Age: 80
End: 2025-05-27
Attending: STUDENT IN AN ORGANIZED HEALTH CARE EDUCATION/TRAINING PROGRAM
Payer: MEDICARE

## 2025-05-27 VITALS
HEIGHT: 62 IN | WEIGHT: 179 LBS | RESPIRATION RATE: 92 BRPM | SYSTOLIC BLOOD PRESSURE: 124 MMHG | HEART RATE: 71 BPM | BODY MASS INDEX: 32.94 KG/M2 | TEMPERATURE: 97.6 F | DIASTOLIC BLOOD PRESSURE: 60 MMHG

## 2025-05-27 DIAGNOSIS — M15.9 OSTEOARTHRITIS INVOLVING MULTIPLE JOINTS ON BOTH SIDES OF BODY: ICD-10-CM

## 2025-05-27 DIAGNOSIS — D84.9 IMMUNOSUPPRESSED STATUS (HCC): ICD-10-CM

## 2025-05-27 DIAGNOSIS — M05.9 SEROPOSITIVE RHEUMATOID ARTHRITIS (HCC): Primary | ICD-10-CM

## 2025-05-27 DIAGNOSIS — M05.9 SEROPOSITIVE RHEUMATOID ARTHRITIS (HCC): ICD-10-CM

## 2025-05-27 LAB
BASOPHILS # BLD AUTO: 0.6 % (ref 0–1.8)
BASOPHILS # BLD: 0.05 K/UL (ref 0–0.12)
EOSINOPHIL # BLD AUTO: 0.25 K/UL (ref 0–0.51)
EOSINOPHIL NFR BLD: 3.2 % (ref 0–6.9)
ERYTHROCYTE [DISTWIDTH] IN BLOOD BY AUTOMATED COUNT: 52.6 FL (ref 35.9–50)
ERYTHROCYTE [SEDIMENTATION RATE] IN BLOOD BY WESTERGREN METHOD: 21 MM/HOUR (ref 0–25)
HCT VFR BLD AUTO: 40.1 % (ref 37–47)
HGB BLD-MCNC: 12.9 G/DL (ref 12–16)
IMM GRANULOCYTES # BLD AUTO: 0.01 K/UL (ref 0–0.11)
IMM GRANULOCYTES NFR BLD AUTO: 0.1 % (ref 0–0.9)
LYMPHOCYTES # BLD AUTO: 2.78 K/UL (ref 1–4.8)
LYMPHOCYTES NFR BLD: 35.8 % (ref 22–41)
MCH RBC QN AUTO: 31.2 PG (ref 27–33)
MCHC RBC AUTO-ENTMCNC: 32.2 G/DL (ref 32.2–35.5)
MCV RBC AUTO: 96.9 FL (ref 81.4–97.8)
MONOCYTES # BLD AUTO: 0.65 K/UL (ref 0–0.85)
MONOCYTES NFR BLD AUTO: 8.4 % (ref 0–13.4)
NEUTROPHILS # BLD AUTO: 4.03 K/UL (ref 1.82–7.42)
NEUTROPHILS NFR BLD: 51.9 % (ref 44–72)
NRBC # BLD AUTO: 0 K/UL
NRBC BLD-RTO: 0 /100 WBC (ref 0–0.2)
PLATELET # BLD AUTO: 241 K/UL (ref 164–446)
PMV BLD AUTO: 10.1 FL (ref 9–12.9)
RBC # BLD AUTO: 4.14 M/UL (ref 4.2–5.4)
WBC # BLD AUTO: 7.8 K/UL (ref 4.8–10.8)

## 2025-05-27 PROCEDURE — 85025 COMPLETE CBC W/AUTO DIFF WBC: CPT

## 2025-05-27 PROCEDURE — 85652 RBC SED RATE AUTOMATED: CPT

## 2025-05-27 PROCEDURE — 86200 CCP ANTIBODY: CPT

## 2025-05-27 PROCEDURE — 3074F SYST BP LT 130 MM HG: CPT | Performed by: STUDENT IN AN ORGANIZED HEALTH CARE EDUCATION/TRAINING PROGRAM

## 2025-05-27 PROCEDURE — 80053 COMPREHEN METABOLIC PANEL: CPT

## 2025-05-27 PROCEDURE — 36415 COLL VENOUS BLD VENIPUNCTURE: CPT

## 2025-05-27 PROCEDURE — 86431 RHEUMATOID FACTOR QUANT: CPT

## 2025-05-27 PROCEDURE — 86140 C-REACTIVE PROTEIN: CPT

## 2025-05-27 PROCEDURE — 99214 OFFICE O/P EST MOD 30 MIN: CPT | Performed by: STUDENT IN AN ORGANIZED HEALTH CARE EDUCATION/TRAINING PROGRAM

## 2025-05-27 PROCEDURE — 83516 IMMUNOASSAY NONANTIBODY: CPT

## 2025-05-27 PROCEDURE — 3078F DIAST BP <80 MM HG: CPT | Performed by: STUDENT IN AN ORGANIZED HEALTH CARE EDUCATION/TRAINING PROGRAM

## 2025-05-27 RX ORDER — METHOTREXATE 2.5 MG/1
15 TABLET ORAL
Qty: 72 TABLET | Refills: 3 | Status: SHIPPED | OUTPATIENT
Start: 2025-05-27

## 2025-05-27 RX ORDER — OMEPRAZOLE 40 MG/1
CAPSULE, DELAYED RELEASE ORAL
COMMUNITY

## 2025-05-27 RX ORDER — HYDROCODONE BITARTRATE AND ACETAMINOPHEN 5; 325 MG/1; MG/1
TABLET ORAL
COMMUNITY

## 2025-05-27 RX ORDER — FOLIC ACID 1 MG/1
1 TABLET ORAL DAILY
Qty: 90 TABLET | Refills: 3 | Status: SHIPPED | OUTPATIENT
Start: 2025-05-27

## 2025-05-27 ASSESSMENT — FIBROSIS 4 INDEX: FIB4 SCORE: 1.55

## 2025-05-27 ASSESSMENT — PATIENT HEALTH QUESTIONNAIRE - PHQ9: CLINICAL INTERPRETATION OF PHQ2 SCORE: 0

## 2025-05-27 NOTE — PROGRESS NOTES
[facility] RHEUMATOLOGY  [address], [address], [place] [address]  Phone: [phone] ? Fax: [phone]  [facility]/Health-Services/Rheumatology    FOLLOW-UP VISIT NOTE         Subjective     DATE OF SERVICE: 05/27/2025    PRIMARY CARE PROVIDER:  Cindy Kearney M.D.  [address]  [place] [address]    PATIENT IDENTIFICATION:  Ania Quevedo  [address]  [place] [address]    YOB: 1945    MEDICAL RECORD NUMBER: [id number]         CHIEF COMPLAINT:   Chief Complaint   Patient presents with    Follow-Up     Seropositive rheumatoid arthritis (HCC)       RHEUMATOLOGIC HISTORY:  Ania Quevedo is a 79 y.o. female with pertinent history notable for seropositive rheumatoid arthritis since 1978, osteoarthritis of multiple joints (hands and feet), and osteopenia of femur/forearm (based on DEXA scan in 1/2021), and questionable fragility fracture of spine among other comorbidities. Previously under the care of a local rheumatologist who has retired (Dr. Dano Torres), she initially presented on 8/30/22 to establish care for continued evaluation and management of her condition. Reported mild intermittent joint/muscle pain in her wrists, mid/lower back, ankles and feet, short-lived morning stiffness that improved with activity but tended to worsen with much activity. Additionally reported body aches, fatigue with weakness, and intermittent numbness of distal legs/feet, but otherwise doing quite well. [place] that her regimen of methotrexate and Remicade infusion had been very helpful at controlling her RA symptoms.     Pertinent treatments: Prednisone taper (helpful), hydroxychloroquine (initially helpful but became ineffective), methotrexate 15 mg PO weekly with folic acid 1 mg daily (1999-present, effective), Remicade 600 mg IV infusion every 8 weeks (1999-present, effective).     Pertinent positive labs: Positive anti-CCP 69 with negative RF (in 8/2022).     Pertinent negative labs:  Negative HBV and HCV (in 9/2021), HLA-B27, CRP, ESR, CBC and CMP (in 8/2022).     Pertinent XR imaging: Hands (in 8/2022) with mild multifocal osteoarthritis and right first MCP erosion versus subchondral cyst. Feet (in 8/2022) with mild multifocal osteoarthritis but no evidence of inflammatory arthropathy.      INTERVAL HISTORY:  Reports interval history as noted on the questionnaire below or scanned under media tab.            REVIEW OF SYSTEMS:  Except as noted in the history above, relevant review of systems with emphasis on autoimmune rheumatic diseases was otherwise negative.      CURRENT PROBLEM LIST:  Patient Active Problem List    Diagnosis Date Noted    Osteoarthritis involving multiple joints on both sides of body 06/22/2023    Immunosuppressed status (HCC) 08/30/2022    Hypokalemia 09/04/2021    COVID-19 virus infection 09/04/2021    Seropositive rheumatoid arthritis (HCC) 09/04/2021    Essential hypertension 09/04/2021    Dehydration 09/04/2021       PAST MEDICAL HISTORY:  Past Medical History[1]    PAST SURGICAL HISTORY:  Past Surgical History[2]    SOCIAL HISTORY:  Social History     Socioeconomic History    Marital status:      Spouse name: Not on file    Number of children: Not on file    Years of education: Not on file    Highest education level: Not on file   Occupational History    Not on file   Tobacco Use    Smoking status: Former     Types: Cigarettes    Smokeless tobacco: Never   Substance and Sexual Activity    Alcohol use: Not Currently    Drug use: Never    Sexual activity: Not on file   Other Topics Concern    Not on file   Social History Narrative    Not on file     Social Drivers of Health     Financial Resource Strain: Not on file   Food Insecurity: Not on file   Transportation Needs: Not on file   Physical Activity: Not on file   Stress: Not on file   Social Connections: Not on file   Intimate Partner Violence: Not on file   Housing Stability: Not on file       FAMILY  HISTORY:  Family History   Problem Relation Age of Onset    Heart Disease Mother        MEDICATIONS:  Current Outpatient Medications   Medication Sig    methotrexate 2.5 MG tablet Take 6 Tablets by mouth every 7 days.    folic acid (FOLVITE) 1 MG Tab Take 1 Tablet by mouth every day.    guaiFENesin ER (MUCINEX) 600 MG TABLET SR 12 HR Take 600 mg by mouth every 12 hours.    Cetirizine HCl 10 MG Cap Take  by mouth.    inFLIXimab (REMICADE IV) Infuse 1 Dose into a venous catheter every 8 weeks.    hydroCHLOROthiazide (HYDRODIURIL) 25 MG Tab Take 25 mg by mouth every Monday, Wednesday, and Friday.    Ascorbic Acid (VITAMIN C PO) Take 1 Tablet by mouth every day.    Cyanocobalamin (VITAMIN B-12 PO) Take 1 Tablet by mouth every day.    VITAMIN D PO Take 1 Tablet by mouth every day.    MAGNESIUM PO Take 1 Tablet by mouth every day.    ibuprofen (MOTRIN) 200 MG Tab Take 600 mg by mouth every 8 hours as needed for Mild Pain.    albuterol 108 (90 Base) MCG/ACT Aero Soln inhalation aerosol Inhale 1 Puff one time.    HYDROcodone-acetaminophen (NORCO) 5-325 MG Tab per tablet TAKE 1 TABLET BY MOUTH EVERY 8 TO 12 HOURS AS NEEDED FOR PAIN    omeprazole (PRILOSEC) 40 MG delayed-release capsule 1 CAPSULE 1/2 TO 1 HOUR BEFORE MORNING MEAL ORALLY ONCE A DAY 30 DAYS    aspirin (ASA) 81 MG Chew Tab chewable tablet Chew 1 Tablet every day. (Patient not taking: Reported on 5/27/2025)       ALLERGIES:   Allergies[3]    IMMUNIZATIONS:  Immunization History   Administered Date(s) Administered    COVID-19, mRNA, LNP-S, PF, alfreda-sucrose, 30 mcg/0.3 mL 05/31/2024    Hep A/HEP B Combined Vaccine (TwinRix) 09/16/2004, 11/19/2004, 10/18/2006    IPV 10/16/1991    Influenza Seasonal Injectable - Historical Data 10/20/2014    Influenza Vaccine Quad Inj (Pf) 09/10/2020    Pneumococcal polysaccharide vaccine (PPSV-23) 11/19/2004    TD Vaccine 09/16/2004    Typhoid Vaccine 09/16/2004, 10/18/2006            Objective     Vital Signs: /60 (BP  "Location: Left arm, Patient Position: Sitting, BP Cuff Size: Adult)   Pulse 71   Temp 36.4 °C (97.6 °F) (Temporal)   Resp (!) 92   Ht 1.575 m (5' 2\")   Wt 81.2 kg (179 lb) Body mass index is 32.74 kg/m².    General: Appears well and comfortable  Eyes: No scleral or conjunctival lesions  ENT: No apparent oral, nasal, or ear lesions  Head/Neck: No apparent scalp or neck lesions  Cardiovascular: Regular rate and rhythm  Respiratory: Breathing quiet and unlabored  Gastrointestinal: No apparent organomegaly or abdominal masses  Integumentary: No significant cutaneous lesions or dyspigmentation  Musculoskeletal: No significant joint tenderness, swelling, warmth, erythema, or overt synovitis; no significant restriction in range of motion of joints examined  Neurologic: No focal sensory or motor deficits  Psychiatric: Mood and affect appropriate      LABORATORY RESULTS REVIEWED AND INTERPRETED:  Lab Results   Component Value Date/Time    CREACTPROT 0.35 06/20/2024 02:07 PM    SEDRATEWES 8 06/20/2024 02:07 PM     Lab Results   Component Value Date/Time    RHEUMFACTN 10 08/30/2022 03:24 PM    CCPANTIBODY 69 (H) 08/30/2022 03:24 PM    KNBT46EBSR Negative 08/30/2022 03:23 PM     Lab Results   Component Value Date/Time    TSHULTRASEN 2.670 09/05/2021 12:49 AM     Lab Results   Component Value Date/Time    WBC 9.4 06/20/2024 02:07 PM    RBC 4.72 06/20/2024 02:07 PM    HEMOGLOBIN 15.1 06/20/2024 02:07 PM    HEMATOCRIT 45.6 06/20/2024 02:07 PM    MCV 96.6 06/20/2024 02:07 PM    MCH 32.0 06/20/2024 02:07 PM    MCHC 33.1 06/20/2024 02:07 PM    RDW 50.8 (H) 06/20/2024 02:07 PM    PLATELETCT 342 06/20/2024 02:07 PM    MPV 11.1 06/20/2024 02:07 PM    NEUTS 5.86 06/20/2024 02:07 PM    LYMPHOCYTES 27.10 06/20/2024 02:07 PM    MONOCYTES 7.00 06/20/2024 02:07 PM    EOSINOPHILS 2.70 06/20/2024 02:07 PM    BASOPHILS 0.70 06/20/2024 02:07 PM     Lab Results   Component Value Date/Time    ASTSGOT 27 06/20/2024 02:07 PM    ALTSGPT 17 " 06/20/2024 02:07 PM    ALKPHOSPHAT 62 06/20/2024 02:07 PM    TBILIRUBIN 0.6 06/20/2024 02:07 PM    TOTPROTEIN 7.8 06/20/2024 02:07 PM    ALBUMIN 4.1 06/20/2024 02:07 PM     Lab Results   Component Value Date/Time    SODIUM 139 06/20/2024 02:07 PM    POTASSIUM 4.0 06/20/2024 02:07 PM    CHLORIDE 98 06/20/2024 02:07 PM    CO2 27 06/20/2024 02:07 PM    GLUCOSE 86 06/20/2024 02:07 PM    BUN 18 06/20/2024 02:07 PM    CREATININE 0.74 06/20/2024 02:07 PM    CALCIUM 10.0 06/20/2024 02:07 PM    MAGNESIUM 1.9 09/05/2021 12:49 AM     Lab Results   Component Value Date/Time    HEPBSAG Non-Reactive 09/05/2021 12:49 AM    HEPBCORIGM Non-Reactive 09/05/2021 12:49 AM    HEPCAB Non-Reactive 09/05/2021 12:49 AM       RADIOLOGY RESULTS REVIEWED AND INTERPRETED:  Results for orders placed during the hospital encounter of 08/30/22    DX-JOINT SURVEY-FEET SINGLE VIEW    Impression  No convincing evidence of inflammatory arthropathy    Mild osteoarthritis    Results for orders placed during the hospital encounter of 08/30/22    DX-JOINT SURVEY-HANDS SINGLE VIEW    Impression  Single right first MCP erosion versus subchondral cyst could support a diagnosis of mild rheumatoid arthritis    Mild osteoarthritis      All relevant laboratory and imaging results reported on this note were reviewed and interpreted by me.         Assessment & Plan     Ania Quevedo is a 79 y.o. female with history and physical as noted above whose presentation merits the following clinical impressions and recommendations:    1. Seropositive rheumatoid arthritis (HCC)  Clinically and serologically remains quiescent in remission on the current long-term regimen of methotrexate and infliximab infusion, so would continue on this regimen as she is not inclined to de-escalating immunosuppression. However, given the potential for discordance between immunologic activity and clinical disease manifestations, reasonable to occasionally reassess her immunologic profile  including markers of disease activity to gauge overall trajectory for risk stratification purposes.  - RHEUMATOID ARTHRITIS PANEL; Future  - ANTI-SA AB, IGG (RDL); Future  - Miscellaneous Test (Mutated Citrullinated Vimentin [MCV] Antibody); Future  - CRP and ESR (previously ordered, so reprinted)  - methotrexate 2.5 MG tablet; Take 6 Tablets by mouth every 7 days.  Dispense: 72 Tablet; Refill: 3  - folic acid (FOLVITE) 1 MG Tab; Take 1 Tablet by mouth every day.  Dispense: 90 Tablet; Refill: 3  - Continue infliximab 600 mg IV infusion every 8 weeks (infusion order to be renewed and sent to Infusion Care as needed)  - Routine follow-up with her local rheumatologist in [facility] when over there during the winter season     2. Osteoarthritis involving multiple joints on both sides of body  Significant etiology of biomechanical arthralgia of her hands and feet which does not seem to be much of an issue at this time but can be managed supportively.  - Tylenol Arthritis and Voltaren 1% gel with or without oral NSAIDs as needed  - Consider intra-articular steroid injection if that becomes necessary     3. Immunosuppressed status (HCC)  High risk immunosuppressant use requiring routine monitoring labs prior to every visit to assess for possible side effects including but not limited to myelotoxicity, hepatotoxicity, and opportunistic infections.  - CBC and CMP (previously ordered, so reprinted)  - Need to ascertain age-appropriate vaccines in addition to the ones listed above under immunizations      The above assessment and plan were discussed with the patient who acknowledged understanding of the plan.    FOLLOW-UP: Return in about 1 year (around 5/27/2026) for Short.         Thank you for the opportunity to participate in the care of Ania Quevedo.    Bran Frias MD, MS, FACR  Rheumatologist, [facility] Rheumatology, [facility]   of Clinical Medicine, Department of Internal  Medicine  [facility] ? [facility]         [1]   Past Medical History:  Diagnosis Date    Back pain     Hypertension     Rheumatoid arthritis (HCC)    [2]   Past Surgical History:  Procedure Laterality Date    DENTAL EXTRACTION(S)     [3]   Allergies  Allergen Reactions    Bacitracin     Dust Mite Mixed Allergen Ext [Mite (D. Farinae)] Unspecified     Cough, SOB

## 2025-05-28 LAB
ALBUMIN SERPL BCP-MCNC: 3.9 G/DL (ref 3.2–4.9)
ALBUMIN/GLOB SERPL: 1 G/DL
ALP SERPL-CCNC: 58 U/L (ref 30–99)
ALT SERPL-CCNC: 17 U/L (ref 2–50)
ANION GAP SERPL CALC-SCNC: 9 MMOL/L (ref 7–16)
AST SERPL-CCNC: 24 U/L (ref 12–45)
BILIRUB SERPL-MCNC: 0.4 MG/DL (ref 0.1–1.5)
BUN SERPL-MCNC: 18 MG/DL (ref 8–22)
CALCIUM ALBUM COR SERPL-MCNC: 9.5 MG/DL (ref 8.5–10.5)
CALCIUM SERPL-MCNC: 9.4 MG/DL (ref 8.5–10.5)
CHLORIDE SERPL-SCNC: 100 MMOL/L (ref 96–112)
CO2 SERPL-SCNC: 27 MMOL/L (ref 20–33)
CREAT SERPL-MCNC: 0.92 MG/DL (ref 0.5–1.4)
CRP SERPL HS-MCNC: <0.3 MG/DL (ref 0–0.75)
GFR SERPLBLD CREATININE-BSD FMLA CKD-EPI: 62 ML/MIN/1.73 M 2
GLOBULIN SER CALC-MCNC: 3.9 G/DL (ref 1.9–3.5)
GLUCOSE SERPL-MCNC: 93 MG/DL (ref 65–99)
POTASSIUM SERPL-SCNC: 4.2 MMOL/L (ref 3.6–5.5)
PROT SERPL-MCNC: 7.8 G/DL (ref 6–8.2)
SODIUM SERPL-SCNC: 136 MMOL/L (ref 135–145)

## 2025-05-28 NOTE — PATIENT INSTRUCTIONS
[facility] AFTER [facility]    Below are important guidelines to help you navigate your health care needs and assist us in caring for you safely and effectively. We encourage you to carefully read and understand this information and adhere to them accordingly.    [employer] Messaging and Phone Calls:  Diagnosis and Treatment - For a detailed explanation of your condition and treatment plan from today's visit, refer to the visit note on [employer] via the following steps:  Log in to [employer] and click on “Visits” at the top.  Scroll down to “Past Visits” under Appointments.  Click on “View Notes” under the appropriate visit date.  Questions or Concerns - MyChart messaging is for non-urgent matters that do not require immediate attention and should be brief with no more than two questions or concerns. If you have multiple questions or concerns, we ask that you schedule an appointment to have them properly addressed.  Response to Messages - [employer] messages are addressed throughout the week depending on clinical availability, so we ask that you allow up to one week for a response.  Phone Calls and Voicemails - Phone calls and voicemail messages are reserved for time-sensitive matters that cannot wait to be addressed via [employer]. We ask that you refrain from calling the office multiple times or leaving multiple voicemails regarding the same issue as doing so may lead to delays in response time.  Urgent Issues - For urgent medical matters or medical emergencies that cannot wait, you are advised to go to your nearest Urgent Care or Emergency Department for immediate attention.    Laboratory Tests and Imaging Studies:  Future Lab and Imaging Orders - We ask that you get your lab tests and imaging studies done no later than one week before your follow-up visit unless instructed otherwise.  Results Communication - You may see some test results marked as “abnormal” that are not necessarily significant or concerning. If  there are significant abnormalities on your test results that warrant further action, you will be notified via MyChart or phone call, otherwise they will be addressed at your follow-up visit.    Prescriptions and Refill Requests:  General Prescriptions (e.g. prednisone, hydroxychloroquine, leflunomide, methotrexate, etc.) - These are sent to Retail Pharmacies, so all refill requests of these medications should be directed to your local pharmacy.  Specialty Prescriptions (e.g. Enbrel, Humira, Cosentyx, Xeljanz, etc.) - These are sent to Specialty Pharmacies, so all refill requests of these medications should be directed to your designated specialty pharmacy.  Infusion Prescriptions (e.g. Remicade, Simponi Aria, Rituxan, Saphnelo, etc.) - These are sent to Outpatient Infusion Centers, so all scheduling requests of these medications should be directed to your local infusion center.    Medication Risks and Adverse Effects:  Immunosuppressed Status - Steroids and antirheumatic drugs are immunosuppressants, so they increase the risk of infections and can have side effects on various organ systems in your body, though most of them are uncommon.  Potential Side Effects - Be sure to read the drug package inserts to learn about the potential side effects of your medications before you start taking them and take them exactly as prescribed unless instructed otherwise.  In Case of Side Effects - If you experience any significant side effects, stop taking the medication immediately and promptly notify the prescriber. Depending on the severity of the side effects, consider going to an Urgent Care or Emergency Department for immediate attention.    Immunizations and Health Screening:  Vaccinations - If you are on immunosuppressive therapy, it is important that you are up to date on age-appropriate immunizations, particularly shingles and pneumonia vaccines, which you can request from your primary care provider or from us at your  next appointment.  Screening Tests - It is also important that you are up to date on age-appropriate screening tests, such as pap smear, mammography, and colonoscopy, which you can request from your primary care provider.    Educational and Supportive Resources:  [employer] [facility] ([email or web address]-[email or web address]) - Visit our website to learn more about your condition and other rheumatic diseases, and gain access to many helpful resources for them.  Disposal of Old Medications (www.titi.gov/everyday-takeback-day) - Visit the Drug Enforcement Administration website to find a nearby location where you can properly dispose of old medications you no longer need.  Disposal of Used [place] (www.safeneedledisposal.org) - Visit the Safe Needle Disposal Organization website to find a nearby location where you can properly dispose of used needles from your injectable medications.

## 2025-05-30 LAB
CARBAMYLATED PROTEIN ANTIBODY, IGG Q6043: 5 UNITS (ref 0–19)
CCP IGA+IGG SERPL IA-ACNC: 142 UNITS (ref 0–19)
RHEUMATOID FACT SER NEPH-ACNC: <10 IU/ML (ref 0–14)

## 2025-06-05 LAB — MISCELLANEOUS LAB RESULT MISCLAB: ABNORMAL

## 2025-06-16 LAB — MUT CIT VIMENTIN AB SER-ACNC: <20 UNITS
